# Patient Record
Sex: FEMALE | Race: WHITE | Employment: UNEMPLOYED | URBAN - METROPOLITAN AREA
[De-identification: names, ages, dates, MRNs, and addresses within clinical notes are randomized per-mention and may not be internally consistent; named-entity substitution may affect disease eponyms.]

---

## 2018-11-30 ENCOUNTER — HOSPITAL ENCOUNTER (INPATIENT)
Age: 33
LOS: 4 days | Discharge: HOME | DRG: 881 | End: 2018-12-04
Attending: EMERGENCY MEDICINE
Payer: SELF-PAY

## 2018-11-30 DIAGNOSIS — R45.89 SUICIDAL BEHAVIOR WITHOUT ATTEMPTED SELF-INJURY: Primary | ICD-10-CM

## 2018-11-30 PROBLEM — F32.A DEPRESSION: Status: ACTIVE | Noted: 2018-11-30

## 2018-11-30 LAB
ALBUMIN SERPL-MCNC: 4.1 G/DL (ref 3.5–5)
ALBUMIN/GLOB SERPL: 1 {RATIO} (ref 1.1–2.2)
ALP SERPL-CCNC: 60 U/L (ref 45–117)
ALT SERPL-CCNC: 19 U/L (ref 12–78)
AMPHET UR QL SCN: NEGATIVE
ANION GAP SERPL CALC-SCNC: 8 MMOL/L (ref 5–15)
APAP SERPL-MCNC: <2 UG/ML (ref 10–30)
APPEARANCE UR: CLEAR
AST SERPL-CCNC: 11 U/L (ref 15–37)
BACTERIA URNS QL MICRO: NEGATIVE /HPF
BARBITURATES UR QL SCN: NEGATIVE
BASOPHILS # BLD: 0.1 K/UL (ref 0–0.1)
BASOPHILS NFR BLD: 1 % (ref 0–1)
BENZODIAZ UR QL: POSITIVE
BILIRUB SERPL-MCNC: 0.3 MG/DL (ref 0.2–1)
BILIRUB UR QL: NEGATIVE
BUN SERPL-MCNC: 7 MG/DL (ref 6–20)
BUN/CREAT SERPL: 10 (ref 12–20)
CALCIUM SERPL-MCNC: 8.8 MG/DL (ref 8.5–10.1)
CANNABINOIDS UR QL SCN: NEGATIVE
CHLORIDE SERPL-SCNC: 108 MMOL/L (ref 97–108)
CO2 SERPL-SCNC: 25 MMOL/L (ref 21–32)
COCAINE UR QL SCN: NEGATIVE
COLOR UR: ABNORMAL
CREAT SERPL-MCNC: 0.71 MG/DL (ref 0.55–1.02)
DIFFERENTIAL METHOD BLD: NORMAL
DRUG SCRN COMMENT,DRGCM: ABNORMAL
EOSINOPHIL # BLD: 0.1 K/UL (ref 0–0.4)
EOSINOPHIL NFR BLD: 1 % (ref 0–7)
EPITH CASTS URNS QL MICRO: ABNORMAL /LPF
ERYTHROCYTE [DISTWIDTH] IN BLOOD BY AUTOMATED COUNT: 12.1 % (ref 11.5–14.5)
ETHANOL SERPL-MCNC: <10 MG/DL
GLOBULIN SER CALC-MCNC: 4 G/DL (ref 2–4)
GLUCOSE SERPL-MCNC: 109 MG/DL (ref 65–100)
GLUCOSE UR STRIP.AUTO-MCNC: NEGATIVE MG/DL
HCG UR QL: NEGATIVE
HCT VFR BLD AUTO: 41.2 % (ref 35–47)
HGB BLD-MCNC: 13.9 G/DL (ref 11.5–16)
HGB UR QL STRIP: ABNORMAL
IMM GRANULOCYTES # BLD: 0 K/UL (ref 0–0.04)
IMM GRANULOCYTES NFR BLD AUTO: 0 % (ref 0–0.5)
KETONES UR QL STRIP.AUTO: NEGATIVE MG/DL
LEUKOCYTE ESTERASE UR QL STRIP.AUTO: NEGATIVE
LYMPHOCYTES # BLD: 1.9 K/UL (ref 0.8–3.5)
LYMPHOCYTES NFR BLD: 26 % (ref 12–49)
MCH RBC QN AUTO: 32.8 PG (ref 26–34)
MCHC RBC AUTO-ENTMCNC: 33.7 G/DL (ref 30–36.5)
MCV RBC AUTO: 97.2 FL (ref 80–99)
METHADONE UR QL: NEGATIVE
MONOCYTES # BLD: 0.4 K/UL (ref 0–1)
MONOCYTES NFR BLD: 5 % (ref 5–13)
NEUTS SEG # BLD: 5 K/UL (ref 1.8–8)
NEUTS SEG NFR BLD: 67 % (ref 32–75)
NITRITE UR QL STRIP.AUTO: NEGATIVE
NRBC # BLD: 0 K/UL (ref 0–0.01)
NRBC BLD-RTO: 0 PER 100 WBC
OPIATES UR QL: POSITIVE
PCP UR QL: NEGATIVE
PH UR STRIP: 7.5 [PH] (ref 5–8)
PLATELET # BLD AUTO: 243 K/UL (ref 150–400)
PMV BLD AUTO: 9.9 FL (ref 8.9–12.9)
POTASSIUM SERPL-SCNC: 4 MMOL/L (ref 3.5–5.1)
PROT SERPL-MCNC: 8.1 G/DL (ref 6.4–8.2)
PROT UR STRIP-MCNC: NEGATIVE MG/DL
RBC # BLD AUTO: 4.24 M/UL (ref 3.8–5.2)
RBC #/AREA URNS HPF: ABNORMAL /HPF (ref 0–5)
SALICYLATES SERPL-MCNC: <1.7 MG/DL (ref 2.8–20)
SODIUM SERPL-SCNC: 141 MMOL/L (ref 136–145)
SP GR UR REFRACTOMETRY: 1.01 (ref 1–1.03)
UROBILINOGEN UR QL STRIP.AUTO: 0.2 EU/DL (ref 0.2–1)
WBC # BLD AUTO: 7.5 K/UL (ref 3.6–11)
WBC URNS QL MICRO: ABNORMAL /HPF (ref 0–4)

## 2018-11-30 PROCEDURE — 81025 URINE PREGNANCY TEST: CPT

## 2018-11-30 PROCEDURE — 80053 COMPREHEN METABOLIC PANEL: CPT

## 2018-11-30 PROCEDURE — 81001 URINALYSIS AUTO W/SCOPE: CPT

## 2018-11-30 PROCEDURE — 80307 DRUG TEST PRSMV CHEM ANLYZR: CPT

## 2018-11-30 PROCEDURE — 65270000029 HC RM PRIVATE

## 2018-11-30 PROCEDURE — 36415 COLL VENOUS BLD VENIPUNCTURE: CPT

## 2018-11-30 PROCEDURE — 74011250637 HC RX REV CODE- 250/637: Performed by: EMERGENCY MEDICINE

## 2018-11-30 PROCEDURE — 99284 EMERGENCY DEPT VISIT MOD MDM: CPT

## 2018-11-30 PROCEDURE — 85025 COMPLETE CBC W/AUTO DIFF WBC: CPT

## 2018-11-30 RX ORDER — BUSPIRONE HYDROCHLORIDE 7.5 MG/1
7.5 TABLET ORAL
Status: ON HOLD | COMMUNITY
End: 2018-12-04 | Stop reason: SDUPTHER

## 2018-11-30 RX ORDER — BACLOFEN 10 MG/1
10 TABLET ORAL
Status: COMPLETED | OUTPATIENT
Start: 2018-11-30 | End: 2018-11-30

## 2018-11-30 RX ORDER — CLONIDINE HYDROCHLORIDE 0.1 MG/1
0.1 TABLET ORAL
Status: COMPLETED | OUTPATIENT
Start: 2018-11-30 | End: 2018-11-30

## 2018-11-30 RX ORDER — BUPROPION HYDROCHLORIDE 300 MG/1
300 TABLET ORAL
Status: ON HOLD | COMMUNITY
End: 2018-12-04 | Stop reason: SDUPTHER

## 2018-11-30 RX ORDER — GABAPENTIN 300 MG/1
600 CAPSULE ORAL 3 TIMES DAILY
Status: ON HOLD | COMMUNITY
End: 2018-12-04 | Stop reason: SDUPTHER

## 2018-11-30 RX ADMIN — CLONIDINE HYDROCHLORIDE 0.1 MG: 0.1 TABLET ORAL at 20:44

## 2018-11-30 RX ADMIN — BACLOFEN 10 MG: 10 TABLET ORAL at 20:44

## 2018-11-30 NOTE — ED TRIAGE NOTES
Pt arrives via personal vehicle from home for medical clearance for rehab facility [facility]). Pt reports last using heroin and Valium yesterday. +depression, +SI. -no SI plan.

## 2018-11-30 NOTE — ED PROVIDER NOTES
33 y.o. female with no significant past medical history who presents from home with chief complaint of SI. Pt had a recent relapse after 2 years of being sober. She states that she has been grieving the death of multiple friends and started using numerous illicit drugs including heroin, benzodiazepines, cocaine, methamphetamine, and LSD. She states that she has been mostly snorting heroin and taking Valium with her last use last night. She had a recent DUI last week and has been experiencing SI since that time. She has a prior suicide attempt 2 years ago where she jumped off of the [facility]. Pt states that she has a good recovery group and sponsor here in [place] and just started taking classes at [address] [place] [address]. Pt denies fever, chills, CP, SOB, N/V/D. There are no other acute medical concerns at this time. Social hx: current everyday tobacco smoker (1 pack/day); (+) illicit drug use ( benzodiazepines, heroin, cocaine, and methamphetamines) Note written by Prakash Ordonez, as dictated by Galileo Saab MD 6:53 PM 
 
 
The history is provided by the patient. No  was used. Past Medical History:  
Diagnosis Date  Anxiety  Depression History reviewed. No pertinent surgical history. History reviewed. No pertinent family history. Social History Socioeconomic History  Marital status: SINGLE Spouse name: Not on file  Number of children: Not on file  Years of education: Not on file  Highest education level: Not on file Social Needs  Financial resource strain: Not on file  Food insecurity - worry: Not on file  Food insecurity - inability: Not on file  Transportation needs - medical: Not on file  Transportation needs - non-medical: Not on file Occupational History  Not on file Tobacco Use  Smoking status: Not on file Substance and Sexual Activity  Alcohol use: Not on file  Drug use: Not on file  Sexual activity: Not on file Other Topics Concern  Not on file Social History Narrative  Not on file ALLERGIES: Patient has no allergy information on record. Review of Systems Constitutional: Negative for chills, diaphoresis and fever. HENT: Negative for congestion, postnasal drip, rhinorrhea and sore throat. Eyes: Negative for photophobia, discharge, redness and visual disturbance. Respiratory: Negative for cough, chest tightness, shortness of breath and wheezing. Cardiovascular: Negative for chest pain, palpitations and leg swelling. Gastrointestinal: Negative for abdominal distention, abdominal pain, blood in stool, constipation, diarrhea, nausea and vomiting. Genitourinary: Negative for difficulty urinating, dysuria, frequency, hematuria and urgency. Musculoskeletal: Negative for arthralgias, back pain, joint swelling and myalgias. Skin: Negative for color change and rash. Neurological: Negative for dizziness, speech difficulty, weakness, light-headedness, numbness and headaches. Psychiatric/Behavioral: Positive for dysphoric mood and suicidal ideas. Negative for confusion. The patient is not nervous/anxious. All other systems reviewed and are negative. Vitals:  
 11/30/18 1609 Pulse: (!) 102 SpO2: 100% Physical Exam  
Constitutional: She is oriented to person, place, and time. She appears well-developed and well-nourished. No distress. HENT:  
Head: Normocephalic and atraumatic. Right Ear: External ear normal.  
Left Ear: External ear normal.  
Nose: Nose normal.  
Mouth/Throat: Oropharynx is clear and moist.  
Eyes: Conjunctivae and EOM are normal. Pupils are equal, round, and reactive to light. No scleral icterus. Neck: Normal range of motion. Neck supple. No JVD present. No tracheal deviation present. No thyromegaly present. Cardiovascular: Normal rate, regular rhythm and normal heart sounds.  Exam reveals no gallop and no friction rub. No murmur heard. Pulmonary/Chest: Effort normal and breath sounds normal. No respiratory distress. She has no wheezes. She has no rales. She exhibits no tenderness. Abdominal: Soft. Bowel sounds are normal. She exhibits no distension and no mass. There is no tenderness. There is no rebound and no guarding. Musculoskeletal: Normal range of motion. She exhibits no edema or tenderness. Lymphadenopathy:  
  She has no cervical adenopathy. Neurological: She is alert and oriented to person, place, and time. She has normal strength. She displays no atrophy and no tremor. No cranial nerve deficit. She exhibits normal muscle tone. Coordination and gait normal.  
Skin: Skin is warm and dry. No rash noted. She is not diaphoretic. No erythema. Areas with erythema and mild plaques consistent with her psoriasis. Psychiatric: Her behavior is normal. Judgment and thought content normal.  
Nursing note and vitals reviewed. Note written by Prakash Jaime, as dictated by Maciej Reeder MD 6:54 PM  
 
MDM Number of Diagnoses or Management Options Diagnosis management comments: RODRIGOS Impression: 67-year-old female with history of multi-substance abuse who had been clean for almost 2 years presents emergency department acute relapse. She is accompanied by her counselor and there're seeking to get her into a previous unit where she was stabilized. She is here for medical clearance. She denies any suicidal homicidal ideation and has been interviewed by behavioral health. Plan of care will be the site rule out medical clearing labs and will continue treating accordingly. Procedures CONSULT NOTE: 
6:55 PM Maciej Reeder MD spoke with Raymundo Rose for [employer]. Discussed available diagnostic tests and clinical findings. The pt's counselor has a bed arranged for her to be admitted at [facility].   
 
 
 8:48 PM 
 Based on evaluation of patient and review of all labs, patient is medically stable and cleared. Mental Health Personnel has been called to see the patient for psychiatric prescreening.

## 2018-12-01 PROCEDURE — 74011250637 HC RX REV CODE- 250/637

## 2018-12-01 PROCEDURE — 74011250637 HC RX REV CODE- 250/637: Performed by: PSYCHIATRY & NEUROLOGY

## 2018-12-01 PROCEDURE — 65220000003 HC RM SEMIPRIVATE PSYCH

## 2018-12-01 RX ORDER — BUPROPION HYDROCHLORIDE 150 MG/1
150 TABLET, EXTENDED RELEASE ORAL DAILY
Status: DISCONTINUED | OUTPATIENT
Start: 2018-12-01 | End: 2018-12-04 | Stop reason: HOSPADM

## 2018-12-01 RX ORDER — IBUPROFEN 200 MG
1 TABLET ORAL
Status: DISCONTINUED | OUTPATIENT
Start: 2018-12-01 | End: 2018-12-04 | Stop reason: HOSPADM

## 2018-12-01 RX ORDER — BENZTROPINE MESYLATE 1 MG/ML
2 INJECTION INTRAMUSCULAR; INTRAVENOUS
Status: DISCONTINUED | OUTPATIENT
Start: 2018-12-01 | End: 2018-12-04 | Stop reason: HOSPADM

## 2018-12-01 RX ORDER — LORAZEPAM 2 MG/1
4 TABLET ORAL
Status: DISCONTINUED | OUTPATIENT
Start: 2018-12-01 | End: 2018-12-03

## 2018-12-01 RX ORDER — ACETAMINOPHEN 325 MG/1
650 TABLET ORAL
Status: DISCONTINUED | OUTPATIENT
Start: 2018-12-01 | End: 2018-12-04 | Stop reason: HOSPADM

## 2018-12-01 RX ORDER — ZOLPIDEM TARTRATE 10 MG/1
10 TABLET ORAL
Status: DISCONTINUED | OUTPATIENT
Start: 2018-12-01 | End: 2018-12-01

## 2018-12-01 RX ORDER — LORAZEPAM 1 MG/1
1 TABLET ORAL
Status: DISCONTINUED | OUTPATIENT
Start: 2018-12-01 | End: 2018-12-02

## 2018-12-01 RX ORDER — IBUPROFEN 400 MG/1
400 TABLET ORAL
Status: DISCONTINUED | OUTPATIENT
Start: 2018-12-01 | End: 2018-12-04 | Stop reason: HOSPADM

## 2018-12-01 RX ORDER — OLANZAPINE 5 MG/1
5 TABLET ORAL
Status: DISCONTINUED | OUTPATIENT
Start: 2018-12-01 | End: 2018-12-04 | Stop reason: HOSPADM

## 2018-12-01 RX ORDER — LORAZEPAM 2 MG/ML
2 INJECTION INTRAMUSCULAR
Status: DISCONTINUED | OUTPATIENT
Start: 2018-12-01 | End: 2018-12-04 | Stop reason: HOSPADM

## 2018-12-01 RX ORDER — GABAPENTIN 300 MG/1
600 CAPSULE ORAL 3 TIMES DAILY
Status: DISCONTINUED | OUTPATIENT
Start: 2018-12-01 | End: 2018-12-04 | Stop reason: HOSPADM

## 2018-12-01 RX ORDER — METHADONE HYDROCHLORIDE 10 MG/1
15 TABLET ORAL DAILY
Status: COMPLETED | OUTPATIENT
Start: 2018-12-02 | End: 2018-12-03

## 2018-12-01 RX ORDER — ZOLPIDEM TARTRATE 5 MG/1
5 TABLET ORAL
Status: DISCONTINUED | OUTPATIENT
Start: 2018-12-01 | End: 2018-12-03

## 2018-12-01 RX ORDER — LORAZEPAM 2 MG/1
2 TABLET ORAL
Status: DISCONTINUED | OUTPATIENT
Start: 2018-12-01 | End: 2018-12-03

## 2018-12-01 RX ORDER — METHADONE HYDROCHLORIDE 10 MG/1
30 TABLET ORAL ONCE
Status: DISCONTINUED | OUTPATIENT
Start: 2018-12-01 | End: 2018-12-01

## 2018-12-01 RX ORDER — BENZTROPINE MESYLATE 2 MG/1
2 TABLET ORAL
Status: DISCONTINUED | OUTPATIENT
Start: 2018-12-01 | End: 2018-12-04 | Stop reason: HOSPADM

## 2018-12-01 RX ORDER — BUSPIRONE HYDROCHLORIDE 5 MG/1
7.5 TABLET ORAL 3 TIMES DAILY
Status: DISCONTINUED | OUTPATIENT
Start: 2018-12-01 | End: 2018-12-04 | Stop reason: HOSPADM

## 2018-12-01 RX ORDER — ADHESIVE BANDAGE
30 BANDAGE TOPICAL DAILY PRN
Status: DISCONTINUED | OUTPATIENT
Start: 2018-12-01 | End: 2018-12-04 | Stop reason: HOSPADM

## 2018-12-01 RX ORDER — METHADONE HYDROCHLORIDE 10 MG/1
30 TABLET ORAL ONCE
Status: COMPLETED | OUTPATIENT
Start: 2018-12-01 | End: 2018-12-01

## 2018-12-01 RX ADMIN — GABAPENTIN 600 MG: 300 CAPSULE ORAL at 21:17

## 2018-12-01 RX ADMIN — BUSPIRONE HYDROCHLORIDE 7.5 MG: 10 TABLET ORAL at 21:17

## 2018-12-01 RX ADMIN — BUPROPION HYDROCHLORIDE 150 MG: 150 TABLET, EXTENDED RELEASE ORAL at 12:38

## 2018-12-01 RX ADMIN — BUSPIRONE HYDROCHLORIDE 7.5 MG: 10 TABLET ORAL at 15:40

## 2018-12-01 RX ADMIN — GABAPENTIN 600 MG: 300 CAPSULE ORAL at 12:38

## 2018-12-01 RX ADMIN — ZOLPIDEM TARTRATE 5 MG: 5 TABLET ORAL at 21:17

## 2018-12-01 RX ADMIN — GABAPENTIN 600 MG: 300 CAPSULE ORAL at 16:30

## 2018-12-01 RX ADMIN — LORAZEPAM 2 MG: 2 TABLET ORAL at 15:39

## 2018-12-01 RX ADMIN — METHADONE HYDROCHLORIDE 30 MG: 10 TABLET ORAL at 09:09

## 2018-12-01 NOTE — BH NOTES
[address] [facility] 60 Master Treatment Plan for [employer] Date Treatment Plan Initiated: 12/1/2018 Treatment Plan Modalities: 
Type of Modality Amount (x minutes) Frequency (x/week) Duration (x days) Name of Responsible Staff Community & wrap-up meetings to encourage peer interactions 15 7 1 GRIS Salinas Group psychotherapy to assist in building coping skills and internal controls 60 7 [address] Therapeutic activity groups to build coping skills 60 7 1 Tucker Hernandez Psychoeducation in group setting to address:  
Medication education 15 7 1 JUAN CARLOS Pereira Coping skills Relaxation techniques Symptom management Discharge planning [address] [place] [address] 71 Spirituality [address] [facility] 60 1 1 volunteer Recovery/AA/NA 
    volunteer Physician medication management 15 7 1 Dr. Ryann Cortes Family meeting/discharge planning Mario Fitzgerald

## 2018-12-01 NOTE — INTERDISCIPLINARY ROUNDS
Behavioral Health Interdisciplinary Rounds Patient Name: Zita Meza  Age: 33 y.o. Room/Bed:  730/02 Primary Diagnosis: <principal problem not specified> Admission Status: Voluntary Readmission within 30 days: no 
Power of  in place: no 
Patient requires a blocked bed: no          Reason for blocked bed: VTE Prophylaxis: No 
 
Mobility needs/Fall risk: no 
Flu Vaccine : no, declined Nutritional Plan: no 
Consults:      
Labs/Testing due today?: no 
 
Sleep hours:  4+ Participation in Care/Groups: new pt. Medication Compliant?: new pt PRNS (last 24 hours): Antianxiety Restraints (last 24 hours):  no 
  
CIWA (range last 24 hours): CIWA-Ar Total: 4  
COWS (range last 24 hours): COWS Total: 5 Alcohol screening (AUDIT) completed - If applicable, date SBIRT discussed in treatment team AND documented:  
 
 
AUDIT Screen Score: Document Brief Intervention (corresponds directly with the 5 A's, Ask, Advise, Assess, Assist, and Arrange):  AA - Unit At- Risk Patients (Score 7-15 for women; 8-15 for men) Discuss concern patient is drinking at unhealthy levels known to increase risk of alcohol-related health problems. Is Patient ready to commit to change? Yes Seeking Detox - In Pt [place] @ [place] If Yes: 
? Set goal: Complete safe detox ? Plan: Refer to In Pt [address] [place] or [facility] for additional days for recovery ? Educational materials provided Harmful use or Dependence (Score 16 or greater) ? Discuss short term and long term health risks of consuming alcohol ? Recommendations ? Negotiate drinking goal 
? Recommend addiction specialist/center ? Arrange follow-up appointments. Tobacco - patient is a smoker:  Yes Illegal Drugs use:  Poly-Substance : heroin, alcohol, benzo's and  
 
24 hour chart check complete: yes Patient goal(s) for today: Safe detox Treatment team focus/goals: Complete Psych Social Assessment Progress note : Alert, fair historian and acknowledged  using lots of drugs - seeking detox so that she can enter in pt [place] 
 
LOS:  1  Expected LOS:  
 
Financial concerns/prescription coverage: [employer] Date of last family contact Family requesting physician contact today:  
Discharge plan: Pt hopes to be admitted to either [facility] or [facility] [address] [facility] Guns in the home: N/A Outpatient provider(s): Maggie Tran - Counselor randhawa [facility] (?) and [facility] Dr Otila Apley ( case maybe closed) Participating treatment team members: Gisela Salomon, Dr Lisa Alberto, Vanessa Thakkar 27 and Hannah Cleveland RN

## 2018-12-01 NOTE — PROGRESS NOTES
Problem: Depressed Mood (Adult/Pediatric) Goal: *STG: Verbalizes anger, guilt, and other feelings in a constructive manor Outcome: Progressing Towards Goal 
Patient has been anxious and angry. She was received agitated and sobbing in her room. Patient splits staff by telling evening shift some complaints about day shift staff today. Staff continue q15 checks and encourage pt to share feelings.

## 2018-12-01 NOTE — BH NOTES
PRN Medication Documentation 1544Specific patient behavior that led to need for PRN medication: received pt agitated. Sobbing in her room, tremulous and complaining of severe anxiety and withdrawal symptoms. CIWA= 10 Staff interventions attempted prior to PRN being given: encourage fluids, give emotional support, encourage positive activities, monitor VS and withdrawal symptoms PRN medication given: ativan 2mg po 
1640 Patient response/effectiveness of PRN medication: CIWA=4.

## 2018-12-01 NOTE — BH NOTES
GROUP THERAPY PROGRESS NOTE Nikos Maciel is participating in [place]. Group time: 15 minutes Personal goal for participation:  
 
Goal orientation: community Group therapy participation: active Therapeutic interventions reviewed and discussed: Unit rules & schedule, importance of setting goals and participating in treatment team meeting. Impression of participation: Pt was participating in community meeting, occasionally closing eyes, but still engaging in conversation. Seemed irritable towards the end of group and said something like \"they keep changing all of my stuff\" and \"It's so hard being here voluntarily. Get me out of here\". But got back on track after being redirected.

## 2018-12-01 NOTE — ROUTINE PROCESS
TRANSFER - OUT REPORT: 
 
Verbal report given to Sarahi Beck RN(name) on Rosette Suh  being transferred to (unit) for routine progression of care Report consisted of patients Situation, Background, Assessment and  
Recommendations(SBAR). Information from the following report(s) SBAR, ED Summary, Procedure Summary, MAR and Recent Results was reviewed with the receiving nurse. Lines:    
 
Opportunity for questions and clarification was provided. Patient transported with: 
 Registered Nurse HPD Officer

## 2018-12-01 NOTE — PROGRESS NOTES
TRANSFER - IN REPORT: 
 
Verbal report received from Estrada Wu [facility]  on [employer]  being received from [facility]/ED  for routine progression of care Report consisted of patients Situation, Background, Assessment and  
Recommendations(SBAR). Information from the following report(s) SBAR, Kardex, ED Summary and Recent Results was reviewed with the receiving nurse. Opportunity for questions and clarification was provided. Assessment completed upon patients arrival to unit and care assumed.

## 2018-12-01 NOTE — BSMART NOTE
PSYCHOSOCIAL ASSESSMENT Patient identifying info: 
Arlen Camp is a 33 y.o., female admitted 11/30/2018  5:41 PM  
 
Presenting problem and precipitating factors: Pt came to the ER seeking tx for depression, anxiety and detox - poly-substances of heroin, valium, methamphetamines, LDS Alicia and etc 
 
Mental status assessment: Alert, tearful,. Seeking help for depression and recent relapse on drugs Current psychiatric/substance abuse providers and contact info: Dr Roverto Beard ( Case maybe closed) and Mynor BERUMEN [facility] weekly therapy Previous psychiatric or substance abuse services/providers and response to treatment: Yes Family history of substance abuse or mental illness: Yes  [facility] Tx 2016 Substance abuse history: [place] use detox heroin, etoh  and benzo's . Pt is also a heavy daily smoker Pt was placed on detox protocol with Methadone for three days Social History Tobacco Use  Smoking status: Current Every Day Smoker Packs/day: 1.00  Smokeless tobacco: Never Used Substance Use Topics  Alcohol use: Yes History of biomedical complications associated with substance abuse:  Unk 
 
Patient's current acceptance of treatment or motivation for change: Yes - detox and enter  In pt sa tx @ [employer] [place] Tx Family constellation: 
 
Is significant other involved? N/A Describe support system: Pt's counselor Clyde Hair) and hs is involved in her tx. Pt sighed CRISTIANA so that he has access to her HPI Describe living arrangements and home environment:  Pt is single, lives with roommates and hopes to enter SA Tx or may have to return home Health issues: Review H&P Hospital Problems  Never Reviewed Codes Class Noted POA Depression ICD-10-CM: F32.9 ICD-9-CM: 942  11/30/2018 Unknown Trauma history: Grief - death of friend- triggered relapse on drugs Legal issues: Yes- 3 DUI 's  
 
History of  service: N/A 
 
 Financial status: Unk 
 
Restoration/cultural factors: Not voiced Education/work history:  HS grad , currently in college but unemployed Have you been licensed as a health care professional ( current or  ) : No 
 
Leisure and recreation preferences: 
 
Describe coping skills: Ineffective and poor judgement Felicia Saavedra 2018
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
## 2018-12-01 NOTE — PROGRESS NOTES
Problem: Opiate Withdrawal 
Goal: *STG: Remains safe in hospital 
Outcome: Progressing Towards Goal 
Visible on the unit. Mood is anxious. Pt denies SI. Compliant with meals and medications. Goal: *STG: Attends activities and groups Outcome: Progressing Towards Goal 
Pt attended treatment team.

## 2018-12-01 NOTE — BH NOTES
1240- pt calm cooperative. Pt states she wants medication to sleep. VS stable. Scheduled medication given. Education provided. Coping skills encouraged. Re evaluate in an hour. CIWA 2, COW 1. Pt using coping skills. 1430- pt tearful, focused wanting more medications. Poor insight. Tolerating education poorly. CIWA 6. Education provided related to detox monitoring and treatment. Pt irritable. Not appropriate to transfer to  general unit at this time.

## 2018-12-02 PROBLEM — F19.20: Status: ACTIVE | Noted: 2018-12-02

## 2018-12-02 LAB
CHOLEST SERPL-MCNC: 219 MG/DL
GLUCOSE P FAST SERPL-MCNC: 79 MG/DL (ref 65–100)
HDLC SERPL-MCNC: 59 MG/DL
HDLC SERPL: 3.7 {RATIO} (ref 0–5)
LDLC SERPL CALC-MCNC: 139.2 MG/DL (ref 0–100)
LIPID PROFILE,FLP: ABNORMAL
TRIGL SERPL-MCNC: 104 MG/DL (ref ?–150)
VLDLC SERPL CALC-MCNC: 20.8 MG/DL

## 2018-12-02 PROCEDURE — 80061 LIPID PANEL: CPT

## 2018-12-02 PROCEDURE — 36415 COLL VENOUS BLD VENIPUNCTURE: CPT

## 2018-12-02 PROCEDURE — 74011250637 HC RX REV CODE- 250/637: Performed by: PSYCHIATRY & NEUROLOGY

## 2018-12-02 PROCEDURE — 74011250637 HC RX REV CODE- 250/637

## 2018-12-02 PROCEDURE — 82947 ASSAY GLUCOSE BLOOD QUANT: CPT

## 2018-12-02 PROCEDURE — 84443 ASSAY THYROID STIM HORMONE: CPT

## 2018-12-02 PROCEDURE — 74011250636 HC RX REV CODE- 250/636

## 2018-12-02 PROCEDURE — 65220000003 HC RM SEMIPRIVATE PSYCH

## 2018-12-02 RX ADMIN — BUSPIRONE HYDROCHLORIDE 7.5 MG: 10 TABLET ORAL at 08:47

## 2018-12-02 RX ADMIN — LORAZEPAM 2 MG: 2 TABLET ORAL at 15:56

## 2018-12-02 RX ADMIN — LORAZEPAM 2 MG: 2 TABLET ORAL at 21:27

## 2018-12-02 RX ADMIN — METHADONE HYDROCHLORIDE 15 MG: 10 TABLET ORAL at 08:47

## 2018-12-02 RX ADMIN — ACETAMINOPHEN 650 MG: 325 TABLET ORAL at 09:32

## 2018-12-02 RX ADMIN — LORAZEPAM 2 MG: 2 TABLET ORAL at 12:37

## 2018-12-02 RX ADMIN — GABAPENTIN 600 MG: 300 CAPSULE ORAL at 21:14

## 2018-12-02 RX ADMIN — GABAPENTIN 600 MG: 300 CAPSULE ORAL at 15:56

## 2018-12-02 RX ADMIN — IBUPROFEN 400 MG: 400 TABLET, FILM COATED ORAL at 12:38

## 2018-12-02 RX ADMIN — BUSPIRONE HYDROCHLORIDE 7.5 MG: 10 TABLET ORAL at 15:56

## 2018-12-02 RX ADMIN — GABAPENTIN 600 MG: 300 CAPSULE ORAL at 08:47

## 2018-12-02 RX ADMIN — ZOLPIDEM TARTRATE 5 MG: 5 TABLET ORAL at 22:11

## 2018-12-02 RX ADMIN — BUSPIRONE HYDROCHLORIDE 7.5 MG: 10 TABLET ORAL at 21:14

## 2018-12-02 RX ADMIN — BUPROPION HYDROCHLORIDE 150 MG: 150 TABLET, EXTENDED RELEASE ORAL at 08:47

## 2018-12-02 NOTE — BH NOTES
GROUP THERAPY PROGRESS NOTE Paris Tariq is participating in [place] [facility]. Group time: 15 minutes Personal goal for participation: Talk with treatment team 
 
Goal orientation: community Group therapy participation: active Therapeutic interventions reviewed and discussed: Unit rules, goals Impression of participation: Active

## 2018-12-02 NOTE — BH NOTES
GROUP THERAPY PROGRESS NOTE Manan Clark is participating in [facility]. Group time: 25 minutes Personal goal for participation: unit orientation and daily progress Goal orientation: personal 
 
Group therapy participation: active Therapeutic interventions reviewed and discussed: yes Impression of participation: Seems in fair spirits. Has been out on unit interacting appropriately with peers and staff though had been tearful early part of shift.  In group, expressed some frustration at not having been fully heard during treatment team.

## 2018-12-02 NOTE — PROGRESS NOTES
Problem: Depressed Mood (Adult/Pediatric) Goal: *STG: Verbalizes anger, guilt, and other feelings in a constructive manor Outcome: Progressing Towards Goal 
Pt has been calm/cooperative and talkative with peers. She denies SI. Staff will continue q15 checks and encourage pt to share feelings.

## 2018-12-02 NOTE — PROGRESS NOTES
Problem: Opiate Withdrawal 
Goal: *STG: Remains safe in hospital 
Outcome: Progressing Towards Goal 
Pt remains safe in the hospital on q 15 safety checks. Denies SI. Detox COW and CIWA monitoring ongoing. Education provided. Pt requires continuous education. Pt irritable, manipulative. Poor coping skills. Poor insight. Staff splitting attempts continuously. Goal: *STG: Attends activities and groups Outcome: Progressing Towards Goal 
Pt visible on the unit. Labile. Communication feelings, needs, concerns with staff. Problem: Depressed Mood (Adult/Pediatric) Goal: *STG: Remains safe in hospital 
Outcome: Progressing Towards Goal 
Pt remains safe in the hospital on q 15 min safety checks. Pt labile. Blaming others. [phone]  gives staff verbal permission to call Shilpi Tarango with [facility] [address] [phone]. Voice mail left for call back. Reason for call: inform Mr. Christina Kramer of scheduled visitation times and ask if Mr Christina Kramer will be able to see pt during scheduled times. [facility] calls unit; states he will see pt 2-3 pm and 7-8 pm. Pt aware. Pt provides updated home address for social work: \"[address] [phone]\"

## 2018-12-02 NOTE — PROGRESS NOTES
12/02/18 1237 CIWA/ETOH Withdrawal Scale Nausea and Vomiting 1 Tactile Disturbances 1 Tremor 0 Auditory Disturbances 0 Paroxysmal Sweats 0 Visual Disturbances 0 Anxiety 2 Headache, Fullness in Head 4 Agitation 0 Orientation and Clouding of Sensorium 0  
CIWA-Ar Total 8 Ativan given per protocol. Continue to monitor. 1337: CIWA reassessed it is a 0.

## 2018-12-02 NOTE — BH NOTES
PRN Medication Documentation 1620 Specific patient behavior that led to need for PRN medication: CIWA=9. Patient complains of withdrawal symptoms Staff interventions attempted prior to PRN being given: monitor vital signs, encourage fluids, give emotional support and encouragement PRN medication given: ativan 2mg po 
1720 Patient response/effectiveness of PRN medication: CIWA=0

## 2018-12-02 NOTE — PROGRESS NOTES
Problem: Falls - Risk of 
Goal: *Absence of Falls Document [place] Fall Risk and appropriate interventions in the flowsheet. Outcome: Progressing Towards Goal 
Fall Risk Interventions: 
 Pt is fall free

## 2018-12-02 NOTE — BH NOTES
PRN Medication Documentation Specific patient behavior that led to need for PRN medication: Pt requested medication PRN medication given: Tylenol for c/o \"HA. \" 
Patient response/effectiveness of PRN medication: pending. 10:29: Medication has been effective. 12:38: PRN Medication Documentation Specific patient behavior that led to need for PRN medication: Pt requested medication PRN medication given: Motrin Patient response/effectiveness of PRN medication: pending

## 2018-12-02 NOTE — BH NOTES
Behavioral Health Interdisciplinary Rounds Patient Name: Diane Medina  Age: 33 y.o. Room/Bed:  730/02 Primary Diagnosis: <principal problem not specified> Admission Status: Voluntary Readmission within 30 days: no 
Power of  in place: no 
Patient requires a blocked bed: no          Reason for blocked bed: n/a 
 
VTE Prophylaxis: Not indicated Mobility needs/Fall risk: no 
Flu Vaccine : no  
Nutritional Plan: no 
Consults: n/a Labs/Testing due today?: yes Sleep hours:  6.25 Participation in Care/Groups:  yes Medication Compliant?: Yes PRNS (last 24 hours): Antianxiety, Sleep Aid and nicoderm Restraints (last 24 hours):  no 
  
CIWA (range last 24 hours): CIWA-Ar Total: 0  
COWS (range last 24 hours): COWS Total: 0 Alcohol screening (AUDIT) completed - If applicable, date SBIRT discussed in treatment team AND documented:  
 
Tobacco - patient is a smoker: Have You Used Tobacco in the Past 30 Days: Yes Illegal Drugs use: Have You Used Any Illegal Substances Over the Past 12 Months: Yes 
 
24 hour chart check complete: yes Patient goal(s) for today:  
Treatment team focus/goals:  
Progress note LOS:  2  Expected LOS:  
 
Financial concerns/prescription coverage:   
Date of last family contact:      
Family requesting physician contact today:   
Discharge plan:  
Guns in the home: Outpatient provider(s):  
 
Participating treatment team members: Diane Medina, * (assigned SW),

## 2018-12-02 NOTE — H&P
INITIAL PSYCHIATRIC EVALUATION   
   
 
IDENTIFICATION:   
Patient Name  Sanchez Mott Date of Birth 1985 [facility] [id number] Medical Record Number  [id number] Age  33 y.o. PCP None Admit date:  2018 Room Number  732/74  @ [facility]  
Date of Service  2018 HISTORY  
 
   
REASON FOR HOSPITALIZATION: 
CC: \"Wanted detox/ rehab\". Pt admitted on a voluntary basis for severe depression, anxiety, substance abuse and SI   
HISTORY OF PRESENT ILLNESS:   
The patient, Sanchez Mott, is a 33 y.o. WHITE [place]  female with a past psychiatric history significant for polysubstance Dependence, MDD and CICI, who presents at this time with complaints of (and/or evidence of) the following emotional symptoms: depression, anxiety, withdrawal sx. The patient reports that she was sober for almost two years until her best friend  two weeks ago and she relapsed. He has been abusing valium, opiates, alcohol, cocaine, mdma. She reports being noncompliant with her medications including wellbutrin, buspar and gabapentin prescribed by Dr. Vahid Burgess. She was seen by her substance abuse counselor who brought her to the ED, with intentions of having her admitted to the . [place] [id number] and then [facility]. Patient motivated and interested in residential rehab program. ALLERGIES: No Known Allergies MEDICATIONS PRIOR TO ADMISSION:  
Medications Prior to Admission Medication Sig  
 gabapentin (NEURONTIN) 300 mg capsule Take 600 mg by mouth three (3) times daily.  busPIRone (BUSPAR) 7.5 mg tablet Take 7.5 mg by mouth three (3) times daily as needed.  buPROPion XL (WELLBUTRIN XL) 300 mg XL tablet Take 300 mg by mouth every morning. PAST MEDICAL HISTORY:  
Past Medical History:  
Diagnosis Date  Anxiety  Depression History reviewed. No pertinent surgical history. SOCIAL HISTORY: Lives alone in [facility]. Attending [employer] for social work. Social History Socioeconomic History  Marital status: SINGLE Spouse name: Not on file  Number of children: Not on file  Years of education: Not on file  Highest education level: Not on file Social Needs  Financial resource strain: Not on file  Food insecurity - worry: Not on file  Food insecurity - inability: Not on file  Transportation needs - medical: Not on file  Transportation needs - non-medical: Not on file Occupational History  Not on file Tobacco Use  Smoking status: Current Every Day Smoker Packs/day: 1.00  Smokeless tobacco: Never Used Substance and Sexual Activity  Alcohol use: Yes  Drug use: Yes Types: Heroin, Cocaine, Methamphetamines, Hallucinogenics, Benzodiazepines  Sexual activity: Not on file Other Topics Concern [address] Service Not Asked  Blood Transfusions Not Asked  Caffeine Concern Not Asked  Occupational Exposure Not Asked Allegra Beggs Hazards Not Asked  Sleep Concern Not Asked  Stress Concern Not Asked  Weight Concern Not Asked  Special Diet Not Asked  Back Care Not Asked  Exercise Not Asked  Bike Helmet Not Asked  Seat Belt Not Asked  Self-Exams Not Asked Social History Narrative  Not on file FAMILY HISTORY: History reviewed. No pertinent family history. History reviewed. No pertinent family history. REVIEW OF SYSTEMS:  
Negative except depression, anxiety Pertinent items are noted in the History of Present Illness. All other Systems reviewed and are considered negative. [facility] MENTAL STATUS EXAM (MSE): MSE FINDINGS ARE WITHIN NORMAL LIMITS (WNL) UNLESS OTHERWISE STATED BELOW. ( ALL OF THE BELOW CATEGORIES OF THE MSE HAVE BEEN REVIEWED (reviewed 12/1/2018) AND UPDATED AS DEEMED APPROPRIATE ) General Presentation age appropriate and casually dressed, cooperative Orientation oriented to time, place and person Vital Signs  See below (reviewed 12/1/2018); Vital Signs (BP, Pulse, & Temp) are within normal limits if not listed below. Gait and Station Stable/steady, no ataxia Musculoskeletal System No extrapyramidal symptoms (EPS); no abnormal muscular movements or Tardive Dyskinesia (TD); muscle strength and tone are within normal limits Language No aphasia or dysarthria Speech:  normal pitch and normal volume Thought Processes logical; normal rate of thoughts; good abstract reasoning/computation Thought Associations goal directed Thought Content free of delusions and free of hallucinations Suicidal Ideations no intention Homicidal Ideations no intention Mood:  anxious  and depressed Affect:  mood-congruent Memory recent  good Memory remote:  good Concentration/Attention:  distractable Fund of Knowledge wnl Insight:  fair Reliability fair Judgment:  fair VITALS:    
Patient Vitals for the past 24 hrs: 
 Temp Pulse Resp BP SpO2  
12/01/18 1946 98.2 °F (36.8 °C) 77 16 111/74   
12/01/18 1614 98.1 °F (36.7 °C) 88 16 153/73 100 % 12/01/18 1158 97.6 °F (36.4 °C) 70 16 127/79 98 % 12/01/18 0828 97.6 °F (36.4 °C) 76 16 107/72 98 % 12/01/18 0720   12    
12/01/18 0005 97.8 °F (36.6 °C) 73 16 128/84 98 % 11/30/18 2337 98.4 °F (36.9 °C) 72 16 128/83 99 % Wt Readings from Last 3 Encounters:  
11/30/18 70.3 kg (155 lb) Temp Readings from Last 3 Encounters:  
12/01/18 98.2 °F (36.8 °C) BP Readings from Last 3 Encounters:  
12/01/18 111/74 Pulse Readings from Last 3 Encounters:  
12/01/18 77 DATA LABORATORY DATA: 
Labs Reviewed METABOLIC PANEL, COMPREHENSIVE - Abnormal; Notable for the following components:  
    Result Value Glucose 109 (*)   
 BUN/Creatinine ratio 10 (*) AST (SGOT) 11 (*) A-G Ratio 1.0 (*) All other components within normal limits DRUG SCREEN, URINE - Abnormal; Notable for the following components: BENZODIAZEPINES POSITIVE (*) OPIATES POSITIVE (*) All other components within normal limits SALICYLATE - Abnormal; Notable for the following components:  
 Salicylate level <2.4 (*) All other components within normal limits ACETAMINOPHEN - Abnormal; Notable for the following components:  
 Acetaminophen level <2 (*) All other components within normal limits URINALYSIS W/ RFLX MICROSCOPIC - Abnormal; Notable for the following components:  
 Blood LARGE (*) All other components within normal limits CBC WITH AUTOMATED DIFF  
ETHYL ALCOHOL  
SAMPLES BEING HELD  
HCG URINE, QL. - POC Admission on 11/30/2018 Component Date Value Ref Range Status  WBC 11/30/2018 7.5  3.6 - 11.0 K/uL Final  
 RBC 11/30/2018 4.24  3.80 - 5.20 M/uL Final  
 HGB 11/30/2018 13.9  11.5 - 16.0 g/dL Final  
 HCT 11/30/2018 41.2  35.0 - 47.0 % Final  
 MCV 11/30/2018 97.2  80.0 - 99.0 FL Final  
 MCH 11/30/2018 32.8  26.0 - 34.0 PG Final  
 MCHC 11/30/2018 33.7  30.0 - 36.5 g/dL Final  
 RDW 11/30/2018 12.1  11.5 - 14.5 % Final  
 PLATELET 18/04/0775 972  150 - 400 K/uL Final  
 MPV 11/30/2018 9.9  8.9 - 12.9 FL Final  
 NRBC 11/30/2018 0.0  0  WBC Final  
 ABSOLUTE NRBC 11/30/2018 0.00  0.00 - 0.01 K/uL Final  
 NEUTROPHILS 11/30/2018 67  32 - 75 % Final  
 LYMPHOCYTES 11/30/2018 26  12 - 49 % Final  
 MONOCYTES 11/30/2018 5  5 - 13 % Final  
 EOSINOPHILS 11/30/2018 1  0 - 7 % Final  
 BASOPHILS 11/30/2018 1  0 - 1 % Final  
 IMMATURE GRANULOCYTES 11/30/2018 0  0.0 - 0.5 % Final  
 ABS. NEUTROPHILS 11/30/2018 5.0  1.8 - 8.0 K/UL Final  
 ABS. LYMPHOCYTES 11/30/2018 1.9  0.8 - 3.5 K/UL Final  
 ABS. MONOCYTES 11/30/2018 0.4  0.0 - 1.0 K/UL Final  
 ABS. EOSINOPHILS 11/30/2018 0.1  0.0 - 0.4 K/UL Final  
 ABS. BASOPHILS 11/30/2018 0.1  0.0 - 0.1 K/UL Final  
 ABS. IMM. GRANS.  11/30/2018 0.0  0.00 - 0.04 K/UL Final  
 DF 11/30/2018 AUTOMATED    Final  
  Sodium 11/30/2018 141  136 - 145 mmol/L Final  
 Potassium 11/30/2018 4.0  3.5 - 5.1 mmol/L Final  
 Chloride 11/30/2018 108  97 - 108 mmol/L Final  
 CO2 11/30/2018 25  21 - 32 mmol/L Final  
 Anion gap 11/30/2018 8  5 - 15 mmol/L Final  
 Glucose 11/30/2018 109* 65 - 100 mg/dL Final  
 BUN 11/30/2018 7  6 - 20 MG/DL Final  
 Creatinine 11/30/2018 0.71  0.55 - 1.02 MG/DL Final  
 BUN/Creatinine ratio 11/30/2018 10* 12 - 20   Final  
 GFR est AA 11/30/2018 >60  >60 ml/min/1.73m2 Final  
 GFR est non-AA 11/30/2018 >60  >60 ml/min/1.73m2 Final  
 Calcium 11/30/2018 8.8  8.5 - 10.1 MG/DL Final  
 Bilirubin, total 11/30/2018 0.3  0.2 - 1.0 MG/DL Final  
 ALT (SGPT) 11/30/2018 19  12 - 78 U/L Final  
 AST (SGOT) 11/30/2018 11* 15 - 37 U/L Final  
 Alk.  phosphatase 11/30/2018 60  45 - 117 U/L Final  
 Protein, total 11/30/2018 8.1  6.4 - 8.2 g/dL Final  
 Albumin 11/30/2018 4.1  3.5 - 5.0 g/dL Final  
 Globulin 11/30/2018 4.0  2.0 - 4.0 g/dL Final  
 A-G Ratio 11/30/2018 1.0* 1.1 - 2.2   Final  
 ALCOHOL(ETHYL),SERUM 11/30/2018 <10  <10 MG/DL Final  
 AMPHETAMINES 11/30/2018 NEGATIVE   NEG   Final  
 BARBITURATES 11/30/2018 NEGATIVE   NEG   Final  
 BENZODIAZEPINES 11/30/2018 POSITIVE* NEG   Final  
 COCAINE 11/30/2018 NEGATIVE   NEG   Final  
 METHADONE 11/30/2018 NEGATIVE   NEG   Final  
 OPIATES 11/30/2018 POSITIVE* NEG   Final  
 PCP(PHENCYCLIDINE) 11/30/2018 NEGATIVE   NEG   Final  
 THC (TH-CANNABINOL) 11/30/2018 NEGATIVE   NEG   Final  
 Drug screen comment 11/30/2018 (NOTE)   Final  
 Salicylate level 53/93/8689 <1.7* 2.8 - 20.0 MG/DL Final  
 Acetaminophen level 11/30/2018 <2* 10 - 30 ug/mL Final  
 Color 11/30/2018 YELLOW/STRAW    Final  
 Appearance 11/30/2018 CLEAR  CLEAR   Final  
 Specific gravity 11/30/2018 1.007  1.003 - 1.030   Final  
 pH (UA) 11/30/2018 7.5  5.0 - 8.0   Final  
 Protein 11/30/2018 NEGATIVE   NEG mg/dL Final  
  Glucose 11/30/2018 NEGATIVE   NEG mg/dL Final  
 Ketone 11/30/2018 NEGATIVE   NEG mg/dL Final  
 Bilirubin 11/30/2018 NEGATIVE   NEG   Final  
 Blood 11/30/2018 LARGE* NEG   Final  
 Urobilinogen 11/30/2018 0.2  0.2 - 1.0 EU/dL Final  
 Nitrites 11/30/2018 NEGATIVE   NEG   Final  
 Leukocyte Esterase 11/30/2018 NEGATIVE   NEG   Final  
 WBC 11/30/2018 0-4  0 - 4 /hpf Final  
 RBC 11/30/2018 0-5  0 - 5 /hpf Final  
 Epithelial cells 11/30/2018 FEW  FEW /lpf Final  
 Bacteria 11/30/2018 NEGATIVE   NEG /hpf Final  
 Pregnancy test,urine (POC) 11/30/2018 NEGATIVE   NEG   Final  
 
  
RADIOLOGY REPORTS: 
No results found for this or any previous visit. No results found. MEDICATIONS ALL MEDICATIONS Current Facility-Administered Medications Medication Dose Route Frequency  ziprasidone (GEODON) 20 mg in sterile water (preservative free) 1 mL injection  20 mg IntraMUSCular BID PRN  
 OLANZapine (ZyPREXA) tablet 5 mg  5 mg Oral Q6H PRN  
 benztropine (COGENTIN) tablet 2 mg  2 mg Oral BID PRN  
 benztropine (COGENTIN) injection 2 mg  2 mg IntraMUSCular BID PRN  
 LORazepam (ATIVAN) injection 2 mg  2 mg IntraMUSCular Q4H PRN  
 LORazepam (ATIVAN) tablet 1 mg  1 mg Oral Q4H PRN  
 acetaminophen (TYLENOL) tablet 650 mg  650 mg Oral Q4H PRN  
 ibuprofen (MOTRIN) tablet 400 mg  400 mg Oral Q8H PRN  
 magnesium hydroxide (MILK OF MAGNESIA) 400 mg/5 mL oral suspension 30 mL  30 mL Oral DAILY PRN  
 nicotine (NICODERM CQ) 21 mg/24 hr patch 1 Patch  1 Patch TransDERmal DAILY PRN  
 [START ON 12/2/2018] methadone (DOLOPHINE) tablet 15 mg  15 mg Oral DAILY  zolpidem (AMBIEN) tablet 5 mg  5 mg Oral QHS PRN  
 gabapentin (NEURONTIN) capsule 600 mg  600 mg Oral TID  busPIRone (BUSPAR) tablet 7.5 mg  7.5 mg Oral TID  buPROPion SR (WELLBUTRIN SR) tablet 150 mg  150 mg Oral DAILY  LORazepam (ATIVAN) tablet 2 mg  2 mg Oral Q1H PRN  
 LORazepam (ATIVAN) tablet 4 mg  4 mg Oral Q1H PRN  
  
 SCHEDULED MEDICATIONS Current Facility-Administered Medications Medication Dose Route Frequency  [START ON 12/2/2018] methadone (DOLOPHINE) tablet 15 mg  15 mg Oral DAILY  gabapentin (NEURONTIN) capsule 600 mg  600 mg Oral TID  busPIRone (BUSPAR) tablet 7.5 mg  7.5 mg Oral TID  buPROPion SR (WELLBUTRIN SR) tablet 150 mg  150 mg Oral DAILY ASSESSMENT & PLAN The patient, Emani Cooley, is a 33 y.o.  female who presents at this time for treatment of the following diagnoses: 
Patient Active Hospital Problem List: 
 
Major Depression (11/30/2018) Assessment: moderate to severe Plan: Agree with inpatient hospitalization for safety monitoring and medication management Medications- gabapentin, buspar, wellbutrin CICI Assessment: moderate, exacerbated by withdrawal 
Plan: Buspar 7.5mg daily Polysubstance Dependence Assessment: chronic, recent relapse Plan:  
Opiate Detox- methadone three day detox Valium/ Etoh detox- ciwa scoring with ativan 
 cocaine dependenc A coordinated, multidisplinary treatment team (includes the nurse, unit pharmcist,  and writer) round was conducted for this initial evaluation with the patient present. The following regarding medications was addressed during rounds with patient:  
the risks and benefits of the proposed medication. The patient was given the opportunity to ask questions. Informed consent given to the use of the above medications. I will continue to adjust psychiatric and non-psychiatric medications (see above \"medication\" section and orders section for details) as deemed appropriate & based upon diagnoses and response to treatment. I have reviewed admission (and previous/old) labs and medical tests in the EHR and or transferring hospital documents.  I will continue to order blood tests/labs and diagnostic tests as deemed appropriate and review results as they become available (see orders for details). I have reviewed old psychiatric and medical records available in the EHR. I Will order additional psychiatric records from other institutions to further elucidate the nature of patient's psychopathology and review once available. I will gather additional collateral information from friends, family and o/p treatment team to further elucidate the nature of patient's psychopathology and baselline level of psychiatric functioning. ESTIMATED LENGTH OF STAY:   
3-5 days STRENGTHS: 
Exercising self-direction/Resourceful and Access to housing/residential stability SIGNED:   
Wade Fraga MD 
12/1/2018

## 2018-12-03 LAB — TSH SERPL DL<=0.05 MIU/L-ACNC: 0.56 UIU/ML (ref 0.36–3.74)

## 2018-12-03 PROCEDURE — 65220000003 HC RM SEMIPRIVATE PSYCH

## 2018-12-03 PROCEDURE — 74011250637 HC RX REV CODE- 250/637

## 2018-12-03 PROCEDURE — 74011250637 HC RX REV CODE- 250/637: Performed by: PSYCHIATRY & NEUROLOGY

## 2018-12-03 RX ORDER — HYDROCORTISONE 1 %
CREAM (GRAM) TOPICAL 2 TIMES DAILY
Status: DISCONTINUED | OUTPATIENT
Start: 2018-12-03 | End: 2018-12-04 | Stop reason: HOSPADM

## 2018-12-03 RX ORDER — HYDROXYZINE 25 MG/1
25 TABLET, FILM COATED ORAL
Status: DISCONTINUED | OUTPATIENT
Start: 2018-12-03 | End: 2018-12-04 | Stop reason: HOSPADM

## 2018-12-03 RX ORDER — TRAZODONE HYDROCHLORIDE 50 MG/1
50 TABLET ORAL
Status: DISCONTINUED | OUTPATIENT
Start: 2018-12-03 | End: 2018-12-04 | Stop reason: HOSPADM

## 2018-12-03 RX ADMIN — IBUPROFEN 400 MG: 400 TABLET, FILM COATED ORAL at 17:24

## 2018-12-03 RX ADMIN — TRAZODONE HYDROCHLORIDE 50 MG: 50 TABLET ORAL at 22:00

## 2018-12-03 RX ADMIN — BUSPIRONE HYDROCHLORIDE 7.5 MG: 10 TABLET ORAL at 21:12

## 2018-12-03 RX ADMIN — LORAZEPAM 2 MG: 2 TABLET ORAL at 01:07

## 2018-12-03 RX ADMIN — BUSPIRONE HYDROCHLORIDE 7.5 MG: 10 TABLET ORAL at 16:57

## 2018-12-03 RX ADMIN — IBUPROFEN 400 MG: 400 TABLET, FILM COATED ORAL at 09:20

## 2018-12-03 RX ADMIN — IBUPROFEN 400 MG: 400 TABLET, FILM COATED ORAL at 00:33

## 2018-12-03 RX ADMIN — ACETAMINOPHEN 650 MG: 325 TABLET ORAL at 15:49

## 2018-12-03 RX ADMIN — GABAPENTIN 600 MG: 300 CAPSULE ORAL at 09:15

## 2018-12-03 RX ADMIN — GABAPENTIN 600 MG: 300 CAPSULE ORAL at 16:58

## 2018-12-03 RX ADMIN — HYDROXYZINE HYDROCHLORIDE 25 MG: 25 TABLET, FILM COATED ORAL at 09:20

## 2018-12-03 RX ADMIN — BUSPIRONE HYDROCHLORIDE 7.5 MG: 10 TABLET ORAL at 09:16

## 2018-12-03 RX ADMIN — HYDROXYZINE HYDROCHLORIDE 25 MG: 25 TABLET, FILM COATED ORAL at 15:48

## 2018-12-03 RX ADMIN — METHADONE HYDROCHLORIDE 15 MG: 10 TABLET ORAL at 09:16

## 2018-12-03 RX ADMIN — ACETAMINOPHEN 650 MG: 325 TABLET ORAL at 21:54

## 2018-12-03 RX ADMIN — HYDROXYZINE HYDROCHLORIDE 25 MG: 25 TABLET, FILM COATED ORAL at 22:26

## 2018-12-03 RX ADMIN — GABAPENTIN 600 MG: 300 CAPSULE ORAL at 21:12

## 2018-12-03 RX ADMIN — BUPROPION HYDROCHLORIDE 150 MG: 150 TABLET, EXTENDED RELEASE ORAL at 09:15

## 2018-12-03 NOTE — BH NOTES
Blocked Bed Documentation: 
 
Room number: 275-3 Type: Behavior Rationale: Poor Self-Control Anticipated duration: TBD Additional comments: calmer this shift , PRN meds given

## 2018-12-03 NOTE — PROGRESS NOTES
Problem: Depressed Mood (Adult/Pediatric) Goal: *STG: Verbalizes anger, guilt, and other feelings in a constructive manor Outcome: Progressing Towards Goal 
Patient initially up to nurse's station angry with staff about the medicine changes Dr Honey Theodore had made. Agitated and tearful. Blaming others. \"I never should've ended up here. Phyllis Cifuentes \"  She is then later seen seated near a female peer and talkative, smiling, friendly. Staff will continue q15 checks and encourage pt to share feelings.

## 2018-12-03 NOTE — PROGRESS NOTES
Problem: Opiate Withdrawal 
Goal: *STG: Remains safe in hospital 
Outcome: Progressing Towards Goal 
Pt remains safe in the hospital on q 15 min safety checks, COW and CIWA monitoring. Goal: *STG: Attends activities and groups Outcome: Progressing Towards Goal 
Pt attending groups and activities. Medication and meal compliant. Education provided coping skills encouraged. Problem: Depressed Mood (Adult/Pediatric) Goal: *STG: Complies with medication therapy Outcome: Progressing Towards Goal 
Anxious, sad, depressed; labile. Medication compliant. Coping skills encouraged.

## 2018-12-03 NOTE — PROGRESS NOTES
Problem: Opiate Withdrawal 
Goal: *STG: Remains safe in hospital 
Outcome: Progressing Towards Goal 
Lying quietly in bed awake , respiration even and unlabored Q15 min safety monitoring continues

## 2018-12-03 NOTE — BH NOTES
PSYCHIATRIC PROGRESS NOTE Patient Name  Maria Guadalupe Mireles Date of Birth 1985 [facility] [id number] Medical Record Number  [id number] Age  33 y.o. PCP None Admit date:  11/30/2018 Room Number  746/83  @ [facility]  
Date of Service  12/2/2018 PSYCHOTHERAPY SESSION NOTE: 
Length of psychotherapy session: 30 minutes Main condition/diagnosis/issues treated during session today, 12/2/2018 : substance abuse  Withdrawal sx, depression, treatment plan I employed Cognitive Behavioral therapy techniques, Reality-Oriented psychotherapy, as well as supportive psychotherapy in regards to various ongoing psychosocial stressors, including the following: pre-admission and current problems;legal issues; medical issues; and stress of hospitalization. Interpersonal relationship issues and psychodynamic conflicts explored. Attempts made to alleviate maladaptive patterns. We, also, worked on issues of denial & effects of substance dependency/use Overall, patient is  progressing Treatment Plan Update (reviewed an updated 12/2/2018) : I will modify psychotherapy tx plan by implementing more stress management strategies, building upon cognitive behavioral techniques, increasing coping skills, as well as shoring up psychological defenses). An extended energy and skill set was needed to engage pt in psychotherapy due to some of the following: resistiveness, complexity, negativity, confrontational nature, hostile behaviors, and/or severe abnormalities in thought processes/psychosis resulting in the loss of expressive/receptive language communication skills. E & M PROGRESS NOTE:  
 
 
HISTORY  
   
CC:  agitated HISTORY OF PRESENT ILLNESS/INTERVAL HISTORY:  (reviewed/updated 12/2/2018). per initial evaluation: The patient, Maria Guadalupe Mireles, is a 33 y.o.   WHITE [place]  female with a past psychiatric history significant for polysubstance Dependence, MDD and CICI, who presents at this time with complaints of (and/or evidence of) the following emotional symptoms: depression, anxiety, withdrawal sx. The patient reports that she was sober for almost two years until her best friend  two weeks ago and she relapsed. He has been abusing valium, opiates, alcohol, cocaine, mdma. She reports being noncompliant with her medications including wellbutrin, buspar and gabapentin prescribed by Dr. Ryan Emery. She was seen by her substance abuse counselor who brought her to the ED, with intentions of having her admitted to the . [place] [address] and then [place]. Patient motivated and interested in residential rehab program.  
 
Cassie March presents/reports/evidences the following emotional symptoms today, patient reported multiple complaints about her care, complaining of anxiety/ skin crawling. Angry because her outpatient counselor cannot have out of hours visiting although she has not spoken with him to see if he is coming. Compliant with meds- but nursing notes lots of med seeking behaviors. Requesting \":anxiety medication\", muscle relaxer, clonidine. Argumentative with staff, trying to find evidence that staff does not care about her. Threatened that she may leave AMA, but later stated that we cannnot discharge her home because she wants to go directly into rehab bed. SIDE EFFECTS: (reviewed/updated 2018) None reported or admitted to. ALLERGIES:(reviewed/updated 2018) No Known Allergies MEDICATIONS PRIOR TO ADMISSION:(reviewed/updated 2018) Medications Prior to Admission Medication Sig  
 gabapentin (NEURONTIN) 300 mg capsule Take 600 mg by mouth three (3) times daily.  busPIRone (BUSPAR) 7.5 mg tablet Take 7.5 mg by mouth three (3) times daily as needed.  buPROPion XL (WELLBUTRIN XL) 300 mg XL tablet Take 300 mg by mouth every morning.   
  
PAST MEDICAL HISTORY: Past medical history from the initial psychiatric evaluation has been reviewed (reviewed/updated 12/2/2018) with no additional updates (I asked patient and no additional past medical history provided). Past Medical History:  
Diagnosis Date  Anxiety  Depression History reviewed. No pertinent surgical history. SOCIAL HISTORY: Social history from the initial psychiatric evaluation has been reviewed (reviewed/updated 12/2/2018) with no additional updates (I asked patient and no additional social history provided). Social History Socioeconomic History  Marital status: SINGLE Spouse name: Not on file  Number of children: Not on file  Years of education: Not on file  Highest education level: Not on file Social Needs  Financial resource strain: Not on file  Food insecurity - worry: Not on file  Food insecurity - inability: Not on file  Transportation needs - medical: Not on file  Transportation needs - non-medical: Not on file Occupational History  Not on file Tobacco Use  Smoking status: Current Every Day Smoker Packs/day: 1.00 Years: 5.00 Pack years: 5.00  Smokeless tobacco: Never Used Substance and Sexual Activity  Alcohol use: Yes  Drug use: Yes Types: Heroin, Cocaine, Methamphetamines, Hallucinogenics, Benzodiazepines  Sexual activity: Not on file Other Topics Concern [address] Service Not Asked  Blood Transfusions Not Asked  Caffeine Concern Not Asked  Occupational Exposure Not Asked [place] Hazards Not Asked  Sleep Concern Not Asked  Stress Concern Not Asked  Weight Concern Not Asked  Special Diet Not Asked  Back Care Not Asked  Exercise Not Asked  Bike Helmet Not Asked  Seat Belt Not Asked  Self-Exams Not Asked Social History Narrative  Not on file FAMILY HISTORY: Family history from the initial psychiatric evaluation has been reviewed (reviewed/updated 12/2/2018) with no additional updates (I asked patient and no additional family history provided). History reviewed. No pertinent family history. REVIEW OF SYSTEMS: (reviewed/updated 12/2/2018) Appetite:good Sleep: good All other Review of Systems: Negative [place] MENTAL STATUS EXAM (MSE): MSE FINDINGS ARE WITHIN NORMAL LIMITS (WNL) UNLESS OTHERWISE STATED BELOW. ( ALL OF THE BELOW CATEGORIES OF THE MSE HAVE BEEN REVIEWED (reviewed 12/2/2018) AND UPDATED AS DEEMED APPROPRIATE ) General Presentation age appropriate and casually dressed, uncooperative and unreliable Orientation oriented to time, place and person Vital Signs  See below (reviewed 12/2/2018); Vital Signs (BP, Pulse, & Temp) are within normal limits if not listed below. Gait and Station Stable/steady, no ataxia Musculoskeletal System No extrapyramidal symptoms (EPS); no abnormal muscular movements or Tardive Dyskinesia (TD); muscle strength and tone are within normal limits Language No aphasia or dysarthria Speech:  normal pitch and normal volume Thought Processes logical; fast rate of thoughts; poor abstract reasoning/computation Thought Associations goal directed Thought Content not internally preoccupied Suicidal Ideations none Homicidal Ideations none Mood:  anxious  and depressed Affect:  anxious and depressed Memory recent  fair Memory remote:  fair Concentration/Attention:  distractable Fund of Knowledge wnl Insight:  limited Reliability poor Judgment:  limited VITALS:    
Patient Vitals for the past 24 hrs: 
 Temp Pulse Resp BP SpO2  
12/02/18 1920 98.3 °F (36.8 °C) 71 16 133/79   
12/02/18 1611 98 °F (36.7 °C) 63 16 124/80 98 % 12/02/18 1244 98.3 °F (36.8 °C) 66 16 136/88 100 % 12/02/18 0844 97.9 °F (36.6 °C) 67 16 123/84 98 % Wt Readings from Last 3 Encounters:  
12/02/18 70.6 kg (155 lb 11.2 oz) Temp Readings from Last 3 Encounters:  
12/02/18 98.3 °F (36.8 °C) BP Readings from Last 3 Encounters:  
12/02/18 133/79 Pulse Readings from Last 3 Encounters:  
12/02/18 71 DATA LABORATORY DATA:(reviewed/updated 12/2/2018) Recent Results (from the past 24 hour(s)) GLUCOSE, FASTING Collection Time: 12/02/18  5:32 AM  
Result Value Ref Range Glucose 79 65 - 100 MG/DL  
LIPID PANEL Collection Time: 12/02/18  5:32 AM  
Result Value Ref Range LIPID PROFILE Cholesterol, total 219 (H) <200 MG/DL Triglyceride 104 <150 MG/DL  
 HDL Cholesterol 59 MG/DL  
 LDL, calculated 139.2 (H) 0 - 100 MG/DL VLDL, calculated 20.8 MG/DL  
 CHOL/HDL Ratio 3.7 0 - 5.0 No results found for: VALF2, VALAC, VALP, VALPR, DS6, CRBAM, CRBAMP, CARB2, XCRBAM 
No results found for: LITHM  
RADIOLOGY REPORTS:(reviewed/updated 12/2/2018) No results found. MEDICATIONS ALL MEDICATIONS:  
Current Facility-Administered Medications Medication Dose Route Frequency  ziprasidone (GEODON) 20 mg in sterile water (preservative free) 1 mL injection  20 mg IntraMUSCular BID PRN  
 OLANZapine (ZyPREXA) tablet 5 mg  5 mg Oral Q6H PRN  
 benztropine (COGENTIN) tablet 2 mg  2 mg Oral BID PRN  
 benztropine (COGENTIN) injection 2 mg  2 mg IntraMUSCular BID PRN  
 LORazepam (ATIVAN) injection 2 mg  2 mg IntraMUSCular Q4H PRN  
 acetaminophen (TYLENOL) tablet 650 mg  650 mg Oral Q4H PRN  
 ibuprofen (MOTRIN) tablet 400 mg  400 mg Oral Q8H PRN  
 magnesium hydroxide (MILK OF MAGNESIA) 400 mg/5 mL oral suspension 30 mL  30 mL Oral DAILY PRN  
 nicotine (NICODERM CQ) 21 mg/24 hr patch 1 Patch  1 Patch TransDERmal DAILY PRN  
 methadone (DOLOPHINE) tablet 15 mg  15 mg Oral DAILY  zolpidem (AMBIEN) tablet 5 mg  5 mg Oral QHS PRN  
 gabapentin (NEURONTIN) capsule 600 mg  600 mg Oral TID  busPIRone (BUSPAR) tablet 7.5 mg  7.5 mg Oral TID  buPROPion SR (WELLBUTRIN SR) tablet 150 mg  150 mg Oral DAILY  LORazepam (ATIVAN) tablet 2 mg  2 mg Oral Q1H PRN  
  LORazepam (ATIVAN) tablet 4 mg  4 mg Oral Q1H PRN  
  
SCHEDULED MEDICATIONS:  
Current Facility-Administered Medications Medication Dose Route Frequency  methadone (DOLOPHINE) tablet 15 mg  15 mg Oral DAILY  gabapentin (NEURONTIN) capsule 600 mg  600 mg Oral TID  busPIRone (BUSPAR) tablet 7.5 mg  7.5 mg Oral TID  buPROPion SR (WELLBUTRIN SR) tablet 150 mg  150 mg Oral DAILY ASSESSMENT & PLAN  
 
DIAGNOSES REQUIRING ACTIVE TREATMENT AND MONITORING: (reviewed/updated 12/2/2018) Patient Active Hospital Problem List: 
 Major Depression (11/30/2018) Assessment: moderate to severe Plan: Agree with inpatient hospitalization for further stabilization, safety monitoring and medication management Medications- resumed home meds- wellbutring SR 150mg daily, buspirone 7.5mg TID Encourage groups Polysubstance (excluding opioids) dependence w/o physiol dependence ([facility] [address] (12/2/2018) Assessment: mod Plan: continue opiate detox Continue benzo/ alcohol detox Encourage patient to attend AA meetings In summary, Sanchez Mott, is a 33 y.o.  female who presents with a severe exacerbation of the principal diagnosis of Depression Patient's condition isimproving. Patient requires continued inpatient hospitalization for further stabilization, safety monitoring and medication management. I will continue to coordinate the provision of individual, milieu, occupational, group, and substance abuse therapies to address target symptoms/diagnoses as deemed appropriate for the individual patient. A coordinated, multidisplinary treatment team round was conducted with the patient (this team consists of the nurse, psychiatric unit pharmcist,  and writer). Complete current electronic health record for patient has been reviewed today including consultant notes, ancillary staff notes, nurses and psychiatric tech notes. Suicide risk assessment completed and patient deemed to be of low risk for suicide at this time. The following regarding medications was addressed during rounds with patient:  
the risks and benefits of the proposed medication. The patient was given the opportunity to ask questions. Informed consent given to the use of the above medications. Will continue to adjust psychiatric and non-psychiatric medications (see above \"medication\" section and orders section for details) as deemed appropriate & based upon diagnoses and response to treatment. I will continue to order blood tests/labs and diagnostic tests as deemed appropriate and review results as they become available (see orders for details and above listed lab/test results). I will order psychiatric records from previous [facility] hospitals to further elucidate the nature of patient's psychopathology and review once available. I will gather additional collateral information from friends, family and o/p treatment team to further elucidate the nature of patient's psychopathology and baselline level of psychiatric functioning. I certify that this patient's inpatient psychiatric hospital services furnished since the previous certification were, and continue to be, required for treatment that could reasonably be expected to improve the patient's condition, or for diagnostic study, and that the patient continues to need, on a daily basis, active treatment furnished directly by or requiring the supervision of inpatient psychiatric facility personnel. In addition, the hospital records show that services furnished were intensive treatment services, admission or related services, or equivalent services. EXPECTED DISCHARGE DATE/DAY: TBD  
 
DISPOSITION: Home Signed By:  
Bennie Mccall MD 
12/2/2018

## 2018-12-03 NOTE — BH NOTES
PRN Medication Documentation Specific patient behavior that led to need for PRN medication: patient asked for ambien to promote rest 
Staff interventions attempted prior to PRN being given: decrease stimuli, bedtime snack given, encouraged pt to share feelings, dim lights, redirection given as pt was just 5 mins before this screaming in her room \"fuck you! \" PRN medication given: Maria Esther Diaz Patient response/effectiveness of PRN medication: pt preparing for bed

## 2018-12-03 NOTE — BH NOTES
Staff was creating a shower list by writing the names of patients on the board. The writer asked, \"would any one else like to be added to this list?\"  The patient said, \"I'm not ready yet. \"  Reminded all patients to inform staff when they are ready to take a shower.

## 2018-12-03 NOTE — PROGRESS NOTES
NUTRITION Nutrition screening referral was triggered based on results obtained during nursing admission assessment for \"unsure wt loss. \" The patient's chart was reviewed and nutrition assessment is not indicated at this time. No wt hx available but weight WNL. Wt Readings from Last 10 Encounters:  
12/02/18 70.6 kg (155 lb 11.2 oz) Please consult if wt loss reported or noted this admit. Predict as mood improved appetite will also improve. Thank you.   
 
Micah Fowler RD

## 2018-12-03 NOTE — BH NOTES
GROUP THERAPY PROGRESS NOTE Rosette Suh did not participate in a 45 minute Acute Unit Process Group, with a focus identifying feelings, planning for the rest of the day, and discussing discharge.

## 2018-12-03 NOTE — BH NOTES
PRN Medication Documentation Specific patient behavior that led to need for PRN medication: generalized pain and increased anxiety Staff interventions attempted prior to PRN being given: decrease in stimuli, relaxation, etc.  
PRN medication given:  mg of tylenol and PO 25 mg atarax Patient response/effectiveness of PRN medication: will reassess within 1 hour and continue to monitor pt

## 2018-12-03 NOTE — BH NOTES
GROUP THERAPY PROGRESS NOTE Beatrice Haywood is participating in [place]. Group time: 15 minutes Personal goal for participation: To maintain a positive attitude. Goal orientation: personal 
 
Group therapy participation: active Therapeutic interventions reviewed and discussed: Writer listened attentive and explained the unit routine. Impression of participation: Patient participated actively.

## 2018-12-03 NOTE — INTERDISCIPLINARY ROUNDS
Behavioral Health Interdisciplinary Rounds Patient Name: Don Buchanan  Age: 29 y.o. Room/Bed:  730/02 Primary Diagnosis: Depression Admission Status: Voluntary Readmission within 30 days: no 
Power of  in place: no 
Patient requires a blocked bed: yes          Reason for blocked bed: loud outbrust , throwing things ,  
 
VTE Prophylaxis: No 
 
Mobility needs/Fall risk: no 
Flu Vaccine : no , declined Nutritional Plan: no 
Consults:       
Labs/Testing due today?: no 
 
Sleep hours:       
Participation in Care/Groups:  yes Medication Compliant?: Yes PRNS (last 24 hours): Antianxiety and Sleep Aid Restraints (last 24 hours):  no 
  
CIWA (range last 24 hours): CIWA-Ar Total: 9  
COWS (range last 24 hours): COWS Total: 3 Alcohol screening (AUDIT) completed -   AUDIT Score: 6 If applicable, date SBIRT discussed in treatment team AND documented:  
 
Tobacco - patient is a smoker: Have You Used Tobacco in the Past 30 Days: Yes Illegal Drugs use: Have You Used Any Illegal Substances Over the Past 12 Months: Yes 
 
24 hour chart check complete: yes Patient goal(s) for today:  
Treatment team focus/goals: Plan to send referral to [facility] Progress note : she has completed her detox, still very anxious LOS:  3  Expected LOS: TBD Financial concerns/prescription coverage:  
Date of last family contact:  SW spoke to her therapist    
Family requesting physician contact today:  
Discharge plan: 30 day program  
Guns in the home:no Outpatient provider(s): [facility] Participating treatment team members: Don Buchanan,  [place] Para Dr. Renetta Cifuentes

## 2018-12-03 NOTE — BH NOTES
PRN Medication Documentation 7/10 Specific patient behavior that led to need for PRN medication: restless leg discomfort Staff interventions attempted prior to PRN being given: reposition , rest  
PRN medication given: Motrin Patient response/effectiveness of PRN medication: pending PRN Medication Documentation CIWA 9 Specific patient behavior that led to need for PRN medication: restless , agitated Staff interventions attempted prior to PRN being given: 1-1 disclosed pt birthday today , upset about being in the hospital  
PRN medication given: Ativan 2 Patient response/effectiveness of PRN medication: good results noted

## 2018-12-03 NOTE — BH NOTES
\"I don't even know why I'm here if I can't get anything for detox. \" 
Patient up at med window angry with staff about meds Dr Honey Theodore has ordered.

## 2018-12-03 NOTE — BH NOTES
PRN Medication Documentation Specific patient behavior that led to need for PRN medication: pt states she wants Motrin as ordered by the doctor. Pt requests anti anxiety medication. Pt focused on Ativan. Tolerates education fair. Staff interventions attempted prior to PRN being given: education, therapeutic communcation PRN medication given: 400 mg Motrin, 25 mg Atarax Patient response/effectiveness of PRN medication: pt calm irritable

## 2018-12-03 NOTE — BH NOTES
Patient was sitting in her room; yelling, screaming and throwing her clothing and other items on the floor. Patient said, \"you all are bunch of fucking losers. This a a waste of my dammed time. You all kept telling me different shit. I made a list for my fucking clothing, he brought me some over size clothing. What the fuck! Tried to talk to patient. Patient continues to yell and scream.  Will continue to monitor. 2120 - Dr Beverly Haley notified. New order to have patient assessed if she requested to leave. 2128 - Patient agreed to stay voluntary.

## 2018-12-03 NOTE — BH NOTES
GROUP THERAPY PROGRESS NOTE Alma Piña is participating in [place]. Group time: 10 minutes Personal goal for participation: Pt stated that her day started with her frustrated but once she was able to understand information provided her the day improved. Goal orientation: personal 
 
Group therapy participation: active Therapeutic interventions reviewed and discussed: Review unit rules and reflect on each Pt's day Impression of participation: Pt was engaged in group

## 2018-12-03 NOTE — BH NOTES
2155: Patient has been complaining last two days that she does not have enough clothes and was only allowed two sets of clothing by [facility] staff upon admission. Wanted to get into her duffel bag stored on general unit to pull out more clothing items. Offered to write down items she wanted but when this staff went to the general unit on a different errand and brought clothes prior to her finishing the list, patient became upset that those were not the clothes she wanted. \"You told me to make that list. My mother packed those clothes. These are too small\". Patient anger escalated and was not receptive to staff redirection or supports. Threw items in her room on floor and was heard yelling and cursing. Staff RN's had to intervene and security staff called for supports. Agreed to take prn po meds and eventually settled down. Staff will monitor.

## 2018-12-04 VITALS
DIASTOLIC BLOOD PRESSURE: 79 MMHG | WEIGHT: 155.7 LBS | HEART RATE: 67 BPM | SYSTOLIC BLOOD PRESSURE: 124 MMHG | BODY MASS INDEX: 23.06 KG/M2 | RESPIRATION RATE: 20 BRPM | HEIGHT: 69 IN | TEMPERATURE: 98.3 F | OXYGEN SATURATION: 99 %

## 2018-12-04 PROCEDURE — 74011250637 HC RX REV CODE- 250/637: Performed by: PSYCHIATRY & NEUROLOGY

## 2018-12-04 PROCEDURE — 74011250637 HC RX REV CODE- 250/637

## 2018-12-04 RX ORDER — BUSPIRONE HYDROCHLORIDE 7.5 MG/1
7.5 TABLET ORAL
Qty: 45 TAB | Refills: 1 | Status: SHIPPED | OUTPATIENT
Start: 2018-12-04

## 2018-12-04 RX ORDER — BUSPIRONE HYDROCHLORIDE 7.5 MG/1
7.5 TABLET ORAL
Qty: 45 TAB | Refills: 0 | Status: SHIPPED | OUTPATIENT
Start: 2018-12-04 | End: 2018-12-04 | Stop reason: SDUPTHER

## 2018-12-04 RX ORDER — TRAZODONE HYDROCHLORIDE 50 MG/1
25 TABLET ORAL
Qty: 15 TAB | Refills: 1 | Status: SHIPPED | OUTPATIENT
Start: 2018-12-04

## 2018-12-04 RX ORDER — BUPROPION HYDROCHLORIDE 300 MG/1
300 TABLET ORAL
Qty: 15 TAB | Refills: 1 | Status: SHIPPED | OUTPATIENT
Start: 2018-12-04

## 2018-12-04 RX ORDER — TRAZODONE HYDROCHLORIDE 50 MG/1
25 TABLET ORAL
Qty: 15 TAB | Refills: 1 | Status: SHIPPED | OUTPATIENT
Start: 2018-12-04 | End: 2018-12-04

## 2018-12-04 RX ORDER — HYDROXYZINE 25 MG/1
25 TABLET, FILM COATED ORAL
Qty: 30 TAB | Refills: 0 | Status: SHIPPED | OUTPATIENT
Start: 2018-12-04 | End: 2018-12-04

## 2018-12-04 RX ORDER — GABAPENTIN 300 MG/1
600 CAPSULE ORAL 3 TIMES DAILY
Qty: 90 CAP | Refills: 1 | Status: SHIPPED | OUTPATIENT
Start: 2018-12-04

## 2018-12-04 RX ORDER — HYDROXYZINE 25 MG/1
25 TABLET, FILM COATED ORAL
Qty: 30 TAB | Refills: 0 | Status: SHIPPED | OUTPATIENT
Start: 2018-12-04

## 2018-12-04 RX ORDER — GABAPENTIN 300 MG/1
600 CAPSULE ORAL 3 TIMES DAILY
Qty: 90 CAP | Refills: 0 | Status: SHIPPED | OUTPATIENT
Start: 2018-12-04

## 2018-12-04 RX ORDER — BUPROPION HYDROCHLORIDE 300 MG/1
300 TABLET ORAL
Qty: 15 TAB | Refills: 0 | Status: SHIPPED | OUTPATIENT
Start: 2018-12-04 | End: 2018-12-04 | Stop reason: SDUPTHER

## 2018-12-04 RX ADMIN — BUSPIRONE HYDROCHLORIDE 7.5 MG: 10 TABLET ORAL at 08:23

## 2018-12-04 RX ADMIN — GABAPENTIN 600 MG: 300 CAPSULE ORAL at 16:34

## 2018-12-04 RX ADMIN — ACETAMINOPHEN 650 MG: 325 TABLET ORAL at 09:50

## 2018-12-04 RX ADMIN — BUPROPION HYDROCHLORIDE 150 MG: 150 TABLET, EXTENDED RELEASE ORAL at 08:22

## 2018-12-04 RX ADMIN — HYDROXYZINE HYDROCHLORIDE 25 MG: 25 TABLET, FILM COATED ORAL at 12:53

## 2018-12-04 RX ADMIN — HYDROXYZINE HYDROCHLORIDE 25 MG: 25 TABLET, FILM COATED ORAL at 08:22

## 2018-12-04 RX ADMIN — IBUPROFEN 400 MG: 400 TABLET, FILM COATED ORAL at 16:34

## 2018-12-04 RX ADMIN — IBUPROFEN 400 MG: 400 TABLET, FILM COATED ORAL at 08:22

## 2018-12-04 RX ADMIN — BUSPIRONE HYDROCHLORIDE 7.5 MG: 10 TABLET ORAL at 16:47

## 2018-12-04 RX ADMIN — GABAPENTIN 600 MG: 300 CAPSULE ORAL at 08:22

## 2018-12-04 NOTE — BH NOTES
GROUP THERAPY PROGRESS NOTE 
  
Alyne Kayser is participating in [place].  
  
Group time: 30 minutes 
  
Personal goal for participation: review goals and relaxation techniques to prepare for 
bed 
  
Goal orientation: community 
  
Group therapy participation: active 
  Therapeutic interventions reviewed and discussed: coping skills Impression of participation: \"my goal was to have a good birthday today. Whitney Brink sang happy birthday to me. I am more depressed each day I'm here. I'm detoxing so every day I feel worse. Like, the first day I was here was the easiest day. I sord of slept it off today. I want to keep my attitude in check also\" Patient shared that she likes to read and journal for relaxation. \"I didn't realize what a gift I have for writing. I have an english class and it's very easy for me\"

## 2018-12-04 NOTE — PROGRESS NOTES
Problem: Falls - Risk of 
Goal: *Absence of Falls Document April Glory Fall Risk and appropriate interventions in the flowsheet. Outcome: Progressing Towards Goal 
Fall Risk Interventions: 
  
 
  
 
Medication Interventions: Teach patient to arise slowly Pt received in bed asleep, no distress noted. Pt remains on q15min safety rounds. Will continue to maintain safety.

## 2018-12-04 NOTE — BH NOTES
Writer called to give report to Yannick at [address]. Discharge instructions have been reviewed and signed by pt. Awaiting arrival of transportation

## 2018-12-04 NOTE — BH NOTES
Pt becomes increasingly loud and argumentative during medication administration. Pt states \" I HATE THAT DOCTER! SHE IS ALWAYS CHANGING MY MEDICATIONS WITHOUT TELLING ME! SHE NEEDS TO [address] RETIRE! \" WHERE IS MY AMBIEN AND MY ANXIETY MEDICATIONS?? I'M FUCKING DETOXING!!\" When staff intervenes and asks pt to stop with disruptive behaviors pt becomes more irritable when asked to go to room. Staff informs pt that she can be discharged AMA per MD if she is unhappy with current treatment. Pt then begins to state \" I'm not leaving until tomorrow! Pt then in return asks to take scheduled medications in a much calmer manner. Staff continues to monitor pt closely for behaviors. 2150 pt asking for all of her scheduled medications. Continues to express her unhappiness in medication regimen given by MD. Staff will continue to monitor q 15 min checks.

## 2018-12-04 NOTE — DISCHARGE INSTRUCTIONS
DISCHARGE SUMMARY    NAME:Rosette Suh  : 1985  MRN: [id number]    The patient Dina Guajardo exhibits the ability to control behavior in a less restrictive environment. Patient's level of functioning is improving. No assaultive/destructive behavior has been observed for the past 24 hours. No suicidal/homicidal threat or behavior has been observed for the past 24 hours. There is no evidence of serious medication side effects. Patient has not been in physical or protective restraints for at least the past 24 hours. If weapons involved, how are they secured? No weapons involved     Is patient aware of and in agreement with discharge plan? Patient is aware of discharge and is in agreement . Arrangements for medication:  Prescriptions filled at [place] [address] of discharge instructions to  provider?: yes, [phone] for transportation home: therapist to      Keep all follow up appointments as scheduled, continue to take prescribed medications per physician instructions. Mental health crisis number:  660 or your local mental health crisis line number at [phone]     [facility] Activation    Thank you for requesting access to [facility]. Please follow the instructions below to securely access and download your online medical record. [facility] allows you to send messages to your doctor, view your test results, renew your prescriptions, schedule appointments, and more. How Do I Sign Up? 1. In your internet browser, go to [email or web address]  2. Click on the First Time User? Click Here link in the Sign In box. You will be redirect to the New Member Sign Up page. 3. Enter your [facility] Access Code exactly as it appears below. You will not need to use this code after youve completed the sign-up process. If you do not sign up before the expiration date, you must request a new code.     [facility] Access Code: [email or web address]  Expires: 2019  4:09 PM (This is the date your [facility] access code will )    4. Enter the last four digits of your Social Security Number (xxxx) and Date of Birth (mm/dd/yyyy) as indicated and click Submit. You will be taken to the next sign-up page. 5. Create a [employer] ID. This will be your [facility] [employer] [facility] login ID and cannot be changed, so think of one that is secure and easy to remember. 6. Create a [facility] password. You can change your password at any time. 7. Enter your Password Reset Question and Answer. This can be used at a later time if you forget your password. 8. Enter your e-mail address. You will receive e-mail notification when new information is available in [address] 9. Click Sign Up. You can now view and download portions of your medical record. 10. Click the Download Summary menu link to download a portable copy of your medical information. Additional Information    If you have questions, please visit the Frequently Asked Questions section of the [facility] [employer] [facility] [employer] website at [email or web address] Remember, [employer] [facility] [employer] is NOT to be used for urgent needs. For medical emergencies, dial 911.

## 2018-12-04 NOTE — BH NOTES
PRN Medication Documentation Specific patient behavior that led to need for PRN medication: increased anxiety Staff interventions attempted prior to PRN being given: decrease in stimuli, dimming lights, deep breathing, etc.   
PRN medication given: PO 25 mg atarax Patient response/effectiveness of PRN medication: will continue to monitor

## 2018-12-04 NOTE — BH NOTES
Patient angry about the medicines that have been changed. She began screaming out very loudly, yelling, cursing and was verbally abusive to staff. Nurse met with patient about what behavior is expected for voluntary patients. Redirection given, nurse encouraged pt to share her feelings without verbal abuse if possible so staff could help her. Patient angry at Dr Deb Walsh about medicines changed. Stimuli decreased Emotional support given Bedtime snack was given Asked pt if she is voluntary or she wants to be discharged. Patient yells and 
demands her medicines and to go to bed.

## 2018-12-04 NOTE — BH NOTES
GROUP THERAPY PROGRESS NOTE Sury Moreno is participating in [place]. Group time: 15 minutes Personal goal for participation: To establish an aftercare plan. Goal orientation: personal 
 
Group therapy participation: active Therapeutic interventions reviewed and discussed: Writer listen attentively and explained the unit routine. Impression of participation: Patient participated actively.

## 2018-12-04 NOTE — BH NOTES
PRN Medication Documentation Specific patient behavior that led to need for PRN medication: generalized pain Staff interventions attempted prior to PRN being given: decrease in stimuli, dimming of lights,etc 
PRN medication given:  mg of tylenol Patient response/effectiveness of PRN medication: will reassess within 1 hour

## 2018-12-04 NOTE — BH NOTES
PRN Medication Documentation At 7944 Specific patient behavior that led to need for PRN medication: c/o anxiety Staff interventions attempted prior to PRN being given: relaxation PRN medication given: atarax 25 mg po prn Patient response/effectiveness of PRN medication: at 0900 med effective per pt PRN Medication Documentation [address] Specific patient behavior that led to need for PRN medication: anxiety Staff interventions attempted prior to PRN being given: atarax 25 mg po prn PRN medication given: relaxation Patient response/effectiveness of PRN medication: at 1330 effective per pt

## 2018-12-04 NOTE — BH NOTES
GROUP THERAPY PROGRESS NOTE Karla Hoffmann is participating in Recreational Therapy. Group time: 1 hour Personal goal for participation: To find pleasure in reading poetry and working on puzzles. Goal orientation: personal 
 
Group therapy participation: active Therapeutic interventions reviewed and discussed: Writer listened attentively and explained the unit routine. Impression of participation: Patient participated actively.

## 2018-12-04 NOTE — BH NOTES
While med nurse is attempting to discuss patient's medications with her, she is repeatedly trying to engage nurse in an argument. Armin Stroud nobody gives a fuck about me here\" Angry with Dr Tatyana Coyne about medication changes made.

## 2018-12-04 NOTE — INTERDISCIPLINARY ROUNDS
Behavioral Health Interdisciplinary Rounds Patient Name: Alma Piña  Age: 29 y.o. Room/Bed:  730/02 Primary Diagnosis: Depression Admission Status: Voluntary Readmission within 30 days: no 
Power of  in place: no 
Patient requires a blocked bed: yes          Reason for blocked bed: loud outbrust , throwing things ,  
 
VTE Prophylaxis: No 
 
Mobility needs/Fall risk: no 
Flu Vaccine : no , declined Nutritional Plan: no 
Consults:       
Labs/Testing due today?: no 
 
Sleep hours:  7.5 Participation in Care/Groups:  yes Medication Compliant?: Yes PRNS (last 24 hours): antianxiety Restraints (last 24 hours):  no 
  
CIWA (range last 24 hours): COWS (range last 24 hours): Alcohol screening (AUDIT) completed -   AUDIT Score: 6 If applicable, date SBIRT discussed in treatment team AND documented:  
 
Tobacco - patient is a smoker: Have You Used Tobacco in the Past 30 Days: Yes Illegal Drugs use: Have You Used Any Illegal Substances Over the Past 12 Months: Yes 
 
24 hour chart check complete: yes Patient goal(s) for today:  
Treatment team focus/goals: Plan for discharge today [facility] Progress note : Plan for discharge today to [facility]. LOS:    Expected LOS: TBD Financial concerns/prescription coverage: no benefits Date of last family contact:  SW spoke to her therapist    
Family requesting physician contact today:  
Discharge plan: 30 day program  
Guns in the home:no Outpatient provider(s): [facility] Participating treatment team members: Dr. Deb Zaragoza - Sulaiman Gaston, JUAN CARLOS- MADDY Howell -

## 2018-12-05 NOTE — BH NOTES
Behavioral Health Transition Record to Provider Patient Name: Maria Guadalupe Mireles YOB: 1985 Medical Record Number: [id number] Date of Admission: 11/30/2018 Date of Discharge: 12/5/2018 Attending Provider: No att. providers found Discharging Provider: Dr. Julia Bernard To contact this individual call [phone] and ask the  to page. If unavailable, ask to be transferred to [facility] Provider on call. [facility] Provider will be available on call 24/7 and during holidays. Primary Care Provider: None No Known Allergies Reason for Admission: \"Wanted detox/ rehab\". Pt admitted on a voluntary basis for severe depression, anxiety, substance abuse and SI Admission Diagnosis: Depression [F32.9] * No surgery found * Results for orders placed or performed during the hospital encounter of 11/30/18 CBC WITH AUTOMATED DIFF Result Value Ref Range WBC 7.5 3.6 - 11.0 K/uL  
 RBC 4.24 3.80 - 5.20 M/uL  
 HGB 13.9 11.5 - 16.0 g/dL HCT 41.2 35.0 - 47.0 % MCV 97.2 80.0 - 99.0 FL  
 MCH 32.8 26.0 - 34.0 PG  
 MCHC 33.7 30.0 - 36.5 g/dL  
 RDW 12.1 11.5 - 14.5 % PLATELET 583 226 - 923 K/uL MPV 9.9 8.9 - 12.9 FL  
 NRBC 0.0 0  WBC ABSOLUTE NRBC 0.00 0.00 - 0.01 K/uL NEUTROPHILS 67 32 - 75 % LYMPHOCYTES 26 12 - 49 % MONOCYTES 5 5 - 13 % EOSINOPHILS 1 0 - 7 % BASOPHILS 1 0 - 1 % IMMATURE GRANULOCYTES 0 0.0 - 0.5 % ABS. NEUTROPHILS 5.0 1.8 - 8.0 K/UL  
 ABS. LYMPHOCYTES 1.9 0.8 - 3.5 K/UL  
 ABS. MONOCYTES 0.4 0.0 - 1.0 K/UL  
 ABS. EOSINOPHILS 0.1 0.0 - 0.4 K/UL  
 ABS. BASOPHILS 0.1 0.0 - 0.1 K/UL  
 ABS. IMM. GRANS. 0.0 0.00 - 0.04 K/UL  
 DF AUTOMATED METABOLIC PANEL, COMPREHENSIVE Result Value Ref Range Sodium 141 136 - 145 mmol/L Potassium 4.0 3.5 - 5.1 mmol/L Chloride 108 97 - 108 mmol/L  
 CO2 25 21 - 32 mmol/L Anion gap 8 5 - 15 mmol/L Glucose 109 (H) 65 - 100 mg/dL  BUN 7 6 - 20 MG/DL  
 Creatinine 0.71 0.55 - 1.02 MG/DL  
 BUN/Creatinine ratio 10 (L) 12 - 20 GFR est AA >60 >60 ml/min/1.73m2 GFR est non-AA >60 >60 ml/min/1.73m2 Calcium 8.8 8.5 - 10.1 MG/DL Bilirubin, total 0.3 0.2 - 1.0 MG/DL  
 ALT (SGPT) 19 12 - 78 U/L  
 AST (SGOT) 11 (L) 15 - 37 U/L Alk. phosphatase 60 45 - 117 U/L Protein, total 8.1 6.4 - 8.2 g/dL Albumin 4.1 3.5 - 5.0 g/dL Globulin 4.0 2.0 - 4.0 g/dL A-G Ratio 1.0 (L) 1.1 - 2.2 ETHYL ALCOHOL Result Value Ref Range ALCOHOL(ETHYL),SERUM <10 <10 MG/DL  
DRUG SCREEN, URINE Result Value Ref Range AMPHETAMINES NEGATIVE  NEG    
 BARBITURATES NEGATIVE  NEG BENZODIAZEPINES POSITIVE (A) NEG    
 COCAINE NEGATIVE  NEG METHADONE NEGATIVE  NEG    
 OPIATES POSITIVE (A) NEG    
 PCP(PHENCYCLIDINE) NEGATIVE  NEG    
 THC (TH-CANNABINOL) NEGATIVE  NEG Drug screen comment (NOTE) SALICYLATE Result Value Ref Range Salicylate level <7.2 (L) 2.8 - 20.0 MG/DL  
ACETAMINOPHEN Result Value Ref Range Acetaminophen level <2 (L) 10 - 30 ug/mL URINALYSIS W/ RFLX MICROSCOPIC Result Value Ref Range Color YELLOW/STRAW Appearance CLEAR CLEAR Specific gravity 1.007 1.003 - 1.030    
 pH (UA) 7.5 5.0 - 8.0 Protein NEGATIVE  NEG mg/dL Glucose NEGATIVE  NEG mg/dL Ketone NEGATIVE  NEG mg/dL Bilirubin NEGATIVE  NEG Blood LARGE (A) NEG Urobilinogen 0.2 0.2 - 1.0 EU/dL Nitrites NEGATIVE  NEG Leukocyte Esterase NEGATIVE  NEG    
 WBC 0-4 0 - 4 /hpf  
 RBC 0-5 0 - 5 /hpf Epithelial cells FEW FEW /lpf Bacteria NEGATIVE  NEG /hpf  
GLUCOSE, FASTING Result Value Ref Range Glucose 79 65 - 100 MG/DL  
LIPID PANEL Result Value Ref Range LIPID PROFILE Cholesterol, total 219 (H) <200 MG/DL Triglyceride 104 <150 MG/DL  
 HDL Cholesterol 59 MG/DL  
 LDL, calculated 139.2 (H) 0 - 100 MG/DL VLDL, calculated 20.8 MG/DL  
 CHOL/HDL Ratio 3.7 0 - 5.0 TSH 3RD GENERATION  
 Result Value Ref Range TSH 0.56 0.36 - 3.74 uIU/mL  
HCG URINE, QL. - POC Result Value Ref Range Pregnancy test,urine (POC) NEGATIVE  NEG Immunizations administered during this encounter: There is no immunization history on file for this patient. Screening for Metabolic Disorders for Patients on Antipsychotic Medications 
(Data obtained from the EMR) Estimated Body Mass Index Estimated body mass index is 22.99 kg/m² as calculated from the following: 
  Height as of this encounter: 5' 9\" (1.753 m). Weight as of this encounter: 70.6 kg (155 lb 11.2 oz). Vital Signs/Blood Pressure Visit Vitals /79 (BP 1 Location: Left arm, BP Patient Position: Sitting) Pulse 67 Temp 98.3 °F (36.8 °C) Resp 20 Ht 5' 9\" (1.753 m) Wt 70.6 kg (155 lb 11.2 oz) LMP 11/28/2018 SpO2 99% Breastfeeding? No  
BMI 22.99 kg/m² Blood Glucose/Hemoglobin A1c Lab Results Component Value Date/Time Glucose 79 12/02/2018 05:32 AM  
 
No results found for: HBA1C, HGBE8, [id number] Lipid Panel Lab Results Component Value Date/Time Cholesterol, total 219 (H) 12/02/2018 05:32 AM  
 HDL Cholesterol 59 12/02/2018 05:32 AM  
 LDL, calculated 139.2 (H) 12/02/2018 05:32 AM  
 Triglyceride 104 12/02/2018 05:32 AM  
 CHOL/HDL Ratio 3.7 12/02/2018 05:32 AM  
 
  
Discharge Diagnosis: Depression / Polysubstance Abuse Discharge Plan: She will be discharge to [place] [address]. The patient Zita Meza exhibits the ability to control behavior in a less restrictive environment. Patient's level of functioning is improving. No assaultive/destructive behavior has been observed for the past 24 hours. No suicidal/homicidal threat or behavior has been observed for the past 24 hours. There is no evidence of serious medication side effects. Patient has not been in physical or protective restraints for at least the past 24 hours. If weapons involved, how are they secured? No weapons involved Is patient aware of and in agreement with discharge plan? Patient is aware of discharge and is in agreement . Arrangements for medication:  Prescriptions filled at [facility] Copy of discharge instructions to  provider?: yes, [phone] Arrangements for transportation home: therapist to  Keep all follow up appointments as scheduled, continue to take prescribed medications per physician instructions. Mental health crisis number:  [phone] or your local mental health crisis line number at [phone] Discharge Medication List and Instructions:  
Discharge Medication List as of 12/4/2018  5:17 PM  
  
CONTINUE these medications which have CHANGED Details  
!! gabapentin (NEURONTIN) 300 mg capsule Take 2 Caps by mouth three (3) times daily. , Normal, Disp-90 Cap, R-0  
  
buPROPion XL (WELLBUTRIN XL) 300 mg XL tablet Take 1 Tab by mouth every morning., Print, Disp-15 Tab, R-1  
  
busPIRone (BUSPAR) 7.5 mg tablet Take 1 Tab by mouth three (3) times daily as needed. , Print, Disp-45 Tab, R-1  
  
!! gabapentin (NEURONTIN) 300 mg capsule Take 2 Caps by mouth three (3) times daily. , Print, Disp-90 Cap, R-1  
  
hydrOXYzine HCl (ATARAX) 25 mg tablet Take 1 Tab by mouth every six (6) hours as needed (1st line anxiety, agitation). , Print, Disp-30 Tab, R-0  
  
traZODone (DESYREL) 50 mg tablet Take 0.5 Tabs by mouth nightly. , Print, Disp-15 Tab, R-1  
  
 !! - Potential duplicate medications found. Please discuss with provider. Unresulted Labs (24h ago, onward) None To obtain results of studies pending at discharge, please contact [phone] Follow-up Information Follow up With Specialties Details Why Contact Info Plan to discharge to [facility]   On 12/4/2018  Marnie Silva , [address]  
[phone] None    None ([phone]) Patient stated that they have no PCP Advanced Directive:  
Does the patient have an appointed surrogate decision maker?  No 
 Does the patient have a Medical Advance Directive? No 
Does the patient have a Psychiatric Advance Directive? No 
If the patient does not have a surrogate or Medical Advance Directive AND Psychiatric Advance Directive, the patient was offered information on these advance directives Patient declined to complete Patient Instructions: Please continue all medications until otherwise directed by physician. Tobacco Cessation Discharge Plan:  
Is the patient a smoker and needs referral for smoking cessation? Yes Patient referred to the following for smoking cessation with an appointment? Refused Patient was offered medication to assist with smoking cessation at discharge? Refused Was education for smoking cessation added to the discharge instructions? Yes Alcohol/Substance Abuse Discharge Plan:  
Does the patient have a history of substance/alcohol abuse and requires a referral for treatment? Yes Patient referred to the following for substance/alcohol abuse treatment with an appointment? Yes Patient was offered medication to assist with alcohol cessation at discharge? No 
Was education for substance/alcohol abuse added to discharge instructions? No 
 
Patient discharged to Home; discussed with patient/caregiver and provided to the patient/caregiver either in hard copy or electronically.

## 2018-12-18 NOTE — DISCHARGE SUMMARY
PSYCHIATRIC DISCHARGE SUMMARY         IDENTIFICATION:    Patient Name  Kelsi Sarkar   Date of Birth 1985   [facility] [id number]   Medical Record Number  [id number]      Age  29 y.o. PCP None   Admit date:  2018    Discharge date: 18   Room Number  730/02  @ [facility]   Date of Service  18            TYPE OF DISCHARGE: REGULAR               CONDITION AT DISCHARGE: improved       PROVISIONAL & DISCHARGE DIAGNOSES:    Problem List  Never Reviewed          Codes Class    Polysubstance (excluding opioids) dependence w/o physiol dependence ([facility] [address] ICD-10-CM: F19.20  ICD-9-CM: 304.80         * (Principal) Depression ICD-10-CM: F32.9  ICD-9-CM: [address] Problems    Polysubstance (excluding opioids) dependence w/o physiol dependence ([facility] [address]      *Depression        DISCHARGE DIAGNOSIS:   Axis I:  SEE ABOVE  Axis II: SEE ABOVE  Axis III: SEE ABOVE  Axis IV:  lack of structure  Axis V:  45 on admission, 55on discharge 65(baseline)       CC & HISTORY OF PRESENT ILLNESS:  The patient, Kelsi Sarkar, is a 33 y.o. WHITE [place]  female with a past psychiatric history significant for polysubstance Dependence, MDD and CICI, who presents at this time with complaints of (and/or evidence of) the following emotional symptoms: depression, anxiety, withdrawal sx. The patient reports that she was sober for almost two years until her best friend  two weeks ago and she relapsed. She has been abusing valium, opiates, alcohol, cocaine, mdma. She reports being noncompliant with her medications including wellbutrin, buspar and gabapentin prescribed by Dr. Chapin Schultz. She was seen by her substance abuse counselor who brought her to the ED, with intentions of having her admitted to the . [place] [address] and then [place].    Patient motivated and interested in residential rehab program.         SOCIAL HISTORY:    Social History     Socioeconomic History    Marital status: SINGLE     Spouse name: Not on file    Number of children: Not on file    Years of education: Not on file    Highest education level: Not on file   Social Needs    Financial resource strain: Not on file    Food insecurity - worry: Not on file    Food insecurity - inability: Not on file    Transportation needs - medical: Not on file   [employer] needs - non-medical: Not on file   Occupational History    Not on file   Tobacco Use    Smoking status: Current Every Day Smoker     Packs/day: 1.00     Years: 5.00     Pack years: 5.00    Smokeless tobacco: Never Used   Substance and Sexual Activity    Alcohol use: Yes    Drug use: Yes     Types: Heroin, Cocaine, Methamphetamines, Hallucinogenics, Benzodiazepines    Sexual activity: Not on file   Other Topics Concern     Service Not Asked    Blood Transfusions Not Asked    Caffeine Concern Not Asked    Occupational Exposure Not Asked    Hobby Hazards Not Asked    Sleep Concern Not Asked    Stress Concern Not Asked    Weight Concern Not Asked    Special Diet Not Asked    Back Care Not Asked    Exercise Not Asked    Bike Helmet Not Asked   [address] Not Asked    Self-Exams Not Asked   Social History Narrative    Not on file      FAMILY HISTORY:   History reviewed. No pertinent family history. HOSPITALIZATION COURSE:    Rosette Suh was admitted to the inpatient psychiatric unit [facility] for acute psychiatric stabilization in regards to symptomatology as described in the HPI above. The differential diagnosis at time of admission included: bipolar dis vs. schizoaffective vs. Schizophrenia. While on the unit Rosette Brand was involved in individual, group, occupational and milieu therapy. Psychiatric medications were adjusted during this hospitalization including (see below). Rosette Suh demonstrated a slow, but progressive improvement in overall condition.  Very argumentative  And manipulative during hospital stay, unable to collaborate with treatment team.  Much of patient's depression appeared to be related to situational stressors and psychological factors. Please see individual progress notes for more specific details regarding patient's hospitalization course. At time of discharge, Dina Guajardo is without significant problems of depression or SI . Patient free of suicidal and homicidal ideations (appears to be at very low risk of suicide or homicide) and reports many positive predictive factors in terms of not attempting suicide or homicide. Overall presentation at time of discharge is most consistent with the diagnosis of Polysubstance dependence. Patient with request for discharge today. There are no grounds to seek a TDO.               LABS AND IMAGING:    Labs Reviewed   METABOLIC PANEL, COMPREHENSIVE - Abnormal; Notable for the following components:       Result Value    Glucose 109 (*)     BUN/Creatinine ratio 10 (*)     AST (SGOT) 11 (*)     A-G Ratio 1.0 (*)     All other components within normal limits   DRUG SCREEN, URINE - Abnormal; Notable for the following components:    BENZODIAZEPINES POSITIVE (*)     OPIATES POSITIVE (*)     All other components within normal limits   SALICYLATE - Abnormal; Notable for the following components:    Salicylate level <1.2 (*)     All other components within normal limits   ACETAMINOPHEN - Abnormal; Notable for the following components:    Acetaminophen level <2 (*)     All other components within normal limits   URINALYSIS W/ RFLX MICROSCOPIC - Abnormal; Notable for the following components:    Blood LARGE (*)     All other components within normal limits   LIPID PANEL - Abnormal; Notable for the following components:    Cholesterol, total 219 (*)     LDL, calculated 139.2 (*)     All other components within normal limits   CBC WITH AUTOMATED DIFF   ETHYL ALCOHOL   GLUCOSE, FASTING   TSH 3RD GENERATION   HCG URINE, QL. - POC     No results found for: DS35, PHEN, PHENO, PHENT, DILF, DS39, PHENY, PTN, VALF2, VALAC, VALP, VALPR, DS6, CRBAM, CRBAMP, CARB2, XCRBAM  Admission on 11/30/2018, Discharged on 12/04/2018   Component Date Value Ref Range Status    WBC 11/30/2018 7.5  3.6 - 11.0 K/uL Final    RBC 11/30/2018 4.24  3.80 - 5.20 M/uL Final    HGB 11/30/2018 13.9  11.5 - 16.0 g/dL Final    HCT 11/30/2018 41.2  35.0 - 47.0 % Final    MCV 11/30/2018 97.2  80.0 - 99.0 FL Final    MCH 11/30/2018 32.8  26.0 - 34.0 PG Final    MCHC 11/30/2018 33.7  30.0 - 36.5 g/dL Final    RDW 11/30/2018 12.1  11.5 - 14.5 % Final    PLATELET 79/35/8542 393  150 - 400 K/uL Final    MPV 11/30/2018 9.9  8.9 - 12.9 FL Final    NRBC 11/30/2018 0.0  0  WBC Final    ABSOLUTE NRBC 11/30/2018 0.00  0.00 - 0.01 K/uL Final    NEUTROPHILS 11/30/2018 67  32 - 75 % Final    LYMPHOCYTES 11/30/2018 26  12 - 49 % Final    MONOCYTES 11/30/2018 5  5 - 13 % Final    EOSINOPHILS 11/30/2018 1  0 - 7 % Final    BASOPHILS 11/30/2018 1  0 - 1 % Final    IMMATURE GRANULOCYTES 11/30/2018 0  0.0 - 0.5 % Final    ABS. NEUTROPHILS 11/30/2018 5.0  1.8 - 8.0 K/UL Final    ABS. LYMPHOCYTES 11/30/2018 1.9  0.8 - 3.5 K/UL Final    ABS. MONOCYTES 11/30/2018 0.4  0.0 - 1.0 K/UL Final    ABS. EOSINOPHILS 11/30/2018 0.1  0.0 - 0.4 K/UL Final    ABS. BASOPHILS 11/30/2018 0.1  0.0 - 0.1 K/UL Final    ABS. IMM. GRANS.  11/30/2018 0.0  0.00 - 0.04 K/UL Final    DF 11/30/2018 AUTOMATED    Final    Sodium 11/30/2018 141  136 - 145 mmol/L Final    Potassium 11/30/2018 4.0  3.5 - 5.1 mmol/L Final    Chloride 11/30/2018 108  97 - 108 mmol/L Final    CO2 11/30/2018 25  21 - 32 mmol/L Final    Anion gap 11/30/2018 8  5 - 15 mmol/L Final    Glucose 11/30/2018 109* 65 - 100 mg/dL Final    BUN 11/30/2018 7  6 - 20 MG/DL Final    Creatinine 11/30/2018 0.71  0.55 - 1.02 MG/DL Final    BUN/Creatinine ratio 11/30/2018 10* 12 - 20   Final    GFR est AA 11/30/2018 >60  >60 ml/min/1.73m2 Final    GFR est non-AA 11/30/2018 >60 >60 ml/min/1.73m2 Final    Calcium 11/30/2018 8.8  8.5 - 10.1 MG/DL Final    Bilirubin, total 11/30/2018 0.3  0.2 - 1.0 MG/DL Final    ALT (SGPT) 11/30/2018 19  12 - 78 U/L Final    AST (SGOT) 11/30/2018 11* 15 - 37 U/L Final    Alk.  phosphatase 11/30/2018 60  45 - 117 U/L Final    Protein, total 11/30/2018 8.1  6.4 - 8.2 g/dL Final    Albumin 11/30/2018 4.1  3.5 - 5.0 g/dL Final    Globulin 11/30/2018 4.0  2.0 - 4.0 g/dL Final    A-G Ratio 11/30/2018 1.0* 1.1 - 2.2   Final    ALCOHOL(ETHYL),SERUM 11/30/2018 <10  <10 MG/DL Final    AMPHETAMINES 11/30/2018 NEGATIVE   NEG   Final    BARBITURATES 11/30/2018 NEGATIVE   NEG   Final    BENZODIAZEPINES 11/30/2018 POSITIVE* NEG   Final    COCAINE 11/30/2018 NEGATIVE   NEG   Final    METHADONE 11/30/2018 NEGATIVE   NEG   Final    OPIATES 11/30/2018 POSITIVE* NEG   Final    PCP(PHENCYCLIDINE) 11/30/2018 NEGATIVE   NEG   Final    THC (TH-CANNABINOL) 11/30/2018 NEGATIVE   NEG   Final    Drug screen comment 11/30/2018 (NOTE)   Final    Salicylate level 25/99/3659 <1.7* 2.8 - 20.0 MG/DL Final    Acetaminophen level 11/30/2018 <2* 10 - 30 ug/mL Final    Color 11/30/2018 YELLOW/STRAW    Final    Appearance 11/30/2018 CLEAR  CLEAR   Final    Specific gravity 11/30/2018 1.007  1.003 - 1.030   Final    pH (UA) 11/30/2018 7.5  5.0 - 8.0   Final    Protein 11/30/2018 NEGATIVE   NEG mg/dL Final    Glucose 11/30/2018 NEGATIVE   NEG mg/dL Final    Ketone 11/30/2018 NEGATIVE   NEG mg/dL Final    Bilirubin 11/30/2018 NEGATIVE   NEG   Final    Blood 11/30/2018 LARGE* NEG   Final    Urobilinogen 11/30/2018 0.2  0.2 - 1.0 EU/dL Final    Nitrites 11/30/2018 NEGATIVE   NEG   Final    Leukocyte Esterase 11/30/2018 NEGATIVE   NEG   Final    WBC 11/30/2018 0-4  0 - 4 /hpf Final    RBC 11/30/2018 0-5  0 - 5 /hpf Final    Epithelial cells 11/30/2018 FEW  FEW /lpf Final    Bacteria 11/30/2018 NEGATIVE   NEG /hpf Final    Pregnancy test,urine (POC) 11/30/2018 NEGATIVE   NEG   Final    Glucose 12/02/2018 79  65 - 100 MG/DL Final    LIPID PROFILE 12/02/2018        Final    Cholesterol, total 12/02/2018 219* <200 MG/DL Final    Triglyceride 12/02/2018 104  <150 MG/DL Final    HDL Cholesterol 12/02/2018 59  MG/DL Final    LDL, calculated 12/02/2018 139.2* 0 - 100 MG/DL Final    VLDL, calculated 12/02/2018 20.8  MG/DL Final    CHOL/HDL Ratio 12/02/2018 3.7  0 - 5.0   Final    TSH 12/02/2018 0.56  0.36 - 3.74 uIU/mL Final     No results found. DISPOSITION:    Home. Patient to f/u with psychiatric and psychotherapy appointments. Patient is to f/u with internist as directed. FOLLOW-UP CARE:    Activity as tolerated  Regular Diet  Wound Care: none needed. Follow-up Information     Follow up With Specialties Details Why Contact Info    Plan to discharge to [facility]   On 12/4/2018  [address] [facility] , [address] [facility] [place]     None    None ([phone]) Patient stated that they have no PCP                   PROGNOSIS:   Good--- based on nature of patient's pathology/ies and treatment compliance issues. Prognosis is greatly dependent upon patient's ability to remain sober and to follow up with drug/etoh rehabilitation and psychiatric/psychotherapy appointments as well as to comply with psychiatric medications as prescribed. DISCHARGE MEDICATIONS:   Informed consent given for the use of following psychotropic medications:  Discharge Medication List as of 12/4/2018  5:17 PM      CONTINUE these medications which have CHANGED    Details   !! gabapentin (NEURONTIN) 300 mg capsule Take 2 Caps by mouth three (3) times daily. , Normal, Disp-90 Cap, R-0      buPROPion XL (WELLBUTRIN XL) 300 mg XL tablet Take 1 Tab by mouth every morning., Print, Disp-15 Tab, R-1      busPIRone (BUSPAR) 7.5 mg tablet Take 1 Tab by mouth three (3) times daily as needed. , Print, Disp-45 Tab, R-1      !! gabapentin (NEURONTIN) 300 mg capsule Take 2 Caps by mouth three (3) times daily. , Print, Disp-90 Cap, R-1      hydrOXYzine HCl (ATARAX) 25 mg tablet Take 1 Tab by mouth every six (6) hours as needed (1st line anxiety, agitation). , Print, Disp-30 Tab, R-0      traZODone (DESYREL) 50 mg tablet Take 0.5 Tabs by mouth nightly. , Print, Disp-15 Tab, R-1       !! - Potential duplicate medications found. Please discuss with provider. A coordinated, multidisplinary treatment team round was conducted with Yasmine Suh---this is done daily here at [facility]. This team consists of the nurse, psychiatric unit pharmcist,  and writer. I have spent greater than 35 minutes on discharge work.     Signed:  Gris Eaton MD  12/4/18

## 2022-08-04 NOTE — PROGRESS NOTES
Problem: Opiate Withdrawal 
Goal: *STG: Remains safe in hospital 
Outcome: Resolved/Met Date Met: 12/04/18 Pt denies any suicidal or homicidal thoughts. Contracts for safety. Remains on Q 15 min safety checks. No agitation or aggression observed. Goal: *STG: Attends activities and groups Outcome: Progressing Towards Goal 
Has attended groups and activities with appropriate interactions, 
Goal: *STG: Verbalizes abstinence as an achievable goal 
Outcome: Progressing Towards Goal 
Variance: Patient slowly responding but states concern about obtaining goal of absenteeism What Type Of Note Output Would You Prefer (Optional)?: Bullet Format How Severe Is Your Skin Lesion?: moderate Has Your Skin Lesion Been Treated?: not been treated Is This A New Presentation, Or A Follow-Up?: Skin Lesion No

## 2023-02-07 NOTE — BH NOTES
PSYCHIATRIC PROGRESS NOTE Patient Name  Don uBchanan Date of Birth 1985 [facility] [id number] Medical Record Number  [id number] Age  29 y.o. PCP None Admit date:  11/30/2018 Room Number  090/32  @ [facility]  
Date of Service  12/3/2018 PSYCHOTHERAPY SESSION NOTE: 
Length of psychotherapy session: 30 minutes Main condition/diagnosis/issues treated during session today, 12/3/2018 : substance abuse  Withdrawal sx, depression, treatment plan I employed Cognitive Behavioral therapy techniques, Reality-Oriented psychotherapy, as well as supportive psychotherapy in regards to various ongoing psychosocial stressors, including the following: pre-admission and current problems;legal issues; medical issues; and stress of hospitalization. Interpersonal relationship issues and psychodynamic conflicts explored. Attempts made to alleviate maladaptive patterns. We, also, worked on issues of denial & effects of substance dependency/use Overall, patient is  progressing Treatment Plan Update (reviewed an updated 12/3/2018) : I will modify psychotherapy tx plan by implementing more stress management strategies, building upon cognitive behavioral techniques, increasing coping skills, as well as shoring up psychological defenses). An extended energy and skill set was needed to engage pt in psychotherapy due to some of the following: resistiveness, complexity, negativity, confrontational nature, hostile behaviors, and/or severe abnormalities in thought processes/psychosis resulting in the loss of expressive/receptive language communication skills. E & M PROGRESS NOTE:  
 
 
HISTORY  
   
CC:  agitatedHISTORY OF PRESENT ILLNESS/INTERVAL HISTORY:  (reviewed/updated 12/3/2018). per initial evaluation: The patient, Don Buchanan, is a 33 y.o.   WHITE [place]  female with a past psychiatric history significant for polysubstance Dependence, SANDRA re: scheduling device check/cardiologist appt   MDD and CICI, who presents at this time with complaints of (and/or evidence of) the following emotional symptoms: depression, anxiety, withdrawal sx. The patient reports that she was sober for almost two years until her best friend  two weeks ago and she relapsed. He has been abusing valium, opiates, alcohol, cocaine, mdma. She reports being noncompliant with her medications including wellbutrin, buspar and gabapentin prescribed by Dr. Yodit May. She was seen by her substance abuse counselor who brought her to the ED, with intentions of having her admitted to the . [place] [address] and then [place]. Patient motivated and interested in residential rehab program.  
 
Hammad Diamond presents/reports/evidences the following emotional symptoms today, complaints of continued withdrawal sx. Very hostile communication with staff, lots of accusations, blaming behavior. Limited accountability on her part. Only slept 2 hours. VSS. No n/v/d/tremors SIDE EFFECTS: (reviewed/updated 12/3/2018) None reported or admitted to. ALLERGIES:(reviewed/updated 12/3/2018) No Known Allergies MEDICATIONS PRIOR TO ADMISSION:(reviewed/updated 12/3/2018) Medications Prior to Admission Medication Sig  
 gabapentin (NEURONTIN) 300 mg capsule Take 600 mg by mouth three (3) times daily.  busPIRone (BUSPAR) 7.5 mg tablet Take 7.5 mg by mouth three (3) times daily as needed.  buPROPion XL (WELLBUTRIN XL) 300 mg XL tablet Take 300 mg by mouth every morning. PAST MEDICAL HISTORY: Past medical history from the initial psychiatric evaluation has been reviewed (reviewed/updated 12/3/2018) with no additional updates (I asked patient and no additional past medical history provided). Past Medical History:  
Diagnosis Date  Anxiety  Depression History reviewed. No pertinent surgical history.   
SOCIAL HISTORY: Social history from the initial psychiatric evaluation has been reviewed (reviewed/updated 12/3/2018) with no additional updates (I asked patient and no additional social history provided). Social History Socioeconomic History  Marital status: SINGLE Spouse name: Not on file  Number of children: Not on file  Years of education: Not on file  Highest education level: Not on file Social Needs  Financial resource strain: Not on file  Food insecurity - worry: Not on file  Food insecurity - inability: Not on file  Transportation needs - medical: Not on file  Transportation needs - non-medical: Not on file Occupational History  Not on file Tobacco Use  Smoking status: Current Every Day Smoker Packs/day: 1.00 Years: 5.00 Pack years: 5.00  Smokeless tobacco: Never Used Substance and Sexual Activity  Alcohol use: Yes  Drug use: Yes Types: Heroin, Cocaine, Methamphetamines, Hallucinogenics, Benzodiazepines  Sexual activity: Not on file Other Topics Concern [address] Service Not Asked  Blood Transfusions Not Asked  Caffeine Concern Not Asked  Occupational Exposure Not Asked Judene Nash Hazards Not Asked  Sleep Concern Not Asked  Stress Concern Not Asked  Weight Concern Not Asked  Special Diet Not Asked  Back Care Not Asked  Exercise Not Asked  Bike Helmet Not Asked  Seat Belt Not Asked  Self-Exams Not Asked Social History Narrative  Not on file FAMILY HISTORY: Family history from the initial psychiatric evaluation has been reviewed (reviewed/updated 12/3/2018) with no additional updates (I asked patient and no additional family history provided). History reviewed. No pertinent family history. REVIEW OF SYSTEMS: (reviewed/updated 12/3/2018) Appetite:good Sleep: good All other Review of Systems: Negative [place] MENTAL STATUS EXAM (MSE): MSE FINDINGS ARE WITHIN NORMAL LIMITS (WNL) UNLESS OTHERWISE STATED BELOW. ( ALL OF THE BELOW CATEGORIES OF THE MSE HAVE BEEN REVIEWED (reviewed 12/3/2018) AND UPDATED AS DEEMED APPROPRIATE ) General Presentation age appropriate and casually dressed, uncooperative and unreliable Orientation oriented to time, place and person Vital Signs  See below (reviewed 12/3/2018); Vital Signs (BP, Pulse, & Temp) are within normal limits if not listed below. Gait and Station Stable/steady, no ataxia Musculoskeletal System No extrapyramidal symptoms (EPS); no abnormal muscular movements or Tardive Dyskinesia (TD); muscle strength and tone are within normal limits Language No aphasia or dysarthria Speech:  normal pitch and normal volume Thought Processes logical; fast rate of thoughts; poor abstract reasoning/computation Thought Associations goal directed Thought Content not internally preoccupied Suicidal Ideations none Homicidal Ideations none Mood:  anxious  and depressed Affect:  anxious and depressed Memory recent  fair Memory remote:  fair Concentration/Attention:  distractable Fund of Knowledge wnl Insight:  limited Reliability poor Judgment:  limited VITALS:    
Patient Vitals for the past 24 hrs: 
 Temp Pulse Resp BP SpO2  
12/03/18 1948 97.8 °F (36.6 °C) 72 16 115/77 98 % 12/03/18 1553 97.6 °F (36.4 °C) 77 16 131/80 100 % 12/03/18 1221 98.1 °F (36.7 °C) 75 16 127/86   
12/03/18 0856 97.8 °F (36.6 °C) 82 16 138/81   
12/03/18 0113 98 °F (36.7 °C) 64 16 138/86  Wt Readings from Last 3 Encounters:  
12/02/18 70.6 kg (155 lb 11.2 oz) Temp Readings from Last 3 Encounters:  
12/03/18 97.8 °F (36.6 °C) BP Readings from Last 3 Encounters:  
12/03/18 115/77 Pulse Readings from Last 3 Encounters:  
12/03/18 72 DATA LABORATORY DATA:(reviewed/updated 12/3/2018) No results found for this or any previous visit (from the past 24 hour(s)).  
No results found for: [place] Blas, VALP, VALPR, DS6, CRBAM, CRBAMP, CARB2, XCRBAM 
 No results found for: LITH  
RADIOLOGY REPORTS:(reviewed/updated 12/3/2018) No results found. MEDICATIONS ALL MEDICATIONS:  
Current Facility-Administered Medications Medication Dose Route Frequency  hydrOXYzine HCl (ATARAX) tablet 25 mg  25 mg Oral Q6H PRN  
 traZODone (DESYREL) tablet 50 mg  50 mg Oral QHS  hydrocortisone (CORTAID) 1 % cream   Topical BID  ziprasidone (GEODON) 20 mg in sterile water (preservative free) 1 mL injection  20 mg IntraMUSCular BID PRN  
 OLANZapine (ZyPREXA) tablet 5 mg  5 mg Oral Q6H PRN  
 benztropine (COGENTIN) tablet 2 mg  2 mg Oral BID PRN  
 benztropine (COGENTIN) injection 2 mg  2 mg IntraMUSCular BID PRN  
 LORazepam (ATIVAN) injection 2 mg  2 mg IntraMUSCular Q4H PRN  
 acetaminophen (TYLENOL) tablet 650 mg  650 mg Oral Q4H PRN  
 ibuprofen (MOTRIN) tablet 400 mg  400 mg Oral Q8H PRN  
 magnesium hydroxide (MILK OF MAGNESIA) 400 mg/5 mL oral suspension 30 mL  30 mL Oral DAILY PRN  
 nicotine (NICODERM CQ) 21 mg/24 hr patch 1 Patch  1 Patch TransDERmal DAILY PRN  
 gabapentin (NEURONTIN) capsule 600 mg  600 mg Oral TID  busPIRone (BUSPAR) tablet 7.5 mg  7.5 mg Oral TID  buPROPion SR (WELLBUTRIN SR) tablet 150 mg  150 mg Oral DAILY  
  
SCHEDULED MEDICATIONS:  
Current Facility-Administered Medications Medication Dose Route Frequency  traZODone (DESYREL) tablet 50 mg  50 mg Oral QHS  hydrocortisone (CORTAID) 1 % cream   Topical BID  
 gabapentin (NEURONTIN) capsule 600 mg  600 mg Oral TID  busPIRone (BUSPAR) tablet 7.5 mg  7.5 mg Oral TID  buPROPion SR (WELLBUTRIN SR) tablet 150 mg  150 mg Oral DAILY ASSESSMENT & PLAN  
 
DIAGNOSES REQUIRING ACTIVE TREATMENT AND MONITORING: (reviewed/updated 12/3/2018) Patient Active Hospital Problem List: 
 Major Depression (11/30/2018) Assessment: moderate to severe   Plan: Agree with inpatient hospitalization for further stabilization, safety monitoring and medication management Medications- resumed home meds- wellbutring SR 150mg daily, buspirone 7.5mg TID Encourage groups Polysubstance (excluding opioids) dependence w/o physiol dependence ([facility] [address] (12/2/2018) Assessment: mod Plan: continue opiate detox Continue benzo/ alcohol detox Encourage patient to attend AA meetings In summary, Emani Cooley, is a 29 y.o.  female who presents with a severe exacerbation of the principal diagnosis of Depression Patient's condition isimproving. Patient requires continued inpatient hospitalization for further stabilization, safety monitoring and medication management. I will continue to coordinate the provision of individual, milieu, occupational, group, and substance abuse therapies to address target symptoms/diagnoses as deemed appropriate for the individual patient. A coordinated, multidisplinary treatment team round was conducted with the patient (this team consists of the nurse, psychiatric unit pharmcist,  and writer). Complete current electronic health record for patient has been reviewed today including consultant notes, ancillary staff notes, nurses and psychiatric tech notes. Suicide risk assessment completed and patient deemed to be of low risk for suicide at this time. The following regarding medications was addressed during rounds with patient:  
the risks and benefits of the proposed medication. The patient was given the opportunity to ask questions. Informed consent given to the use of the above medications. Will continue to adjust psychiatric and non-psychiatric medications (see above \"medication\" section and orders section for details) as deemed appropriate & based upon diagnoses and response to treatment. I will continue to order blood tests/labs and diagnostic tests as deemed appropriate and review results as they become available (see orders for details and above listed lab/test results). I will order psychiatric records from previous [facility] hospitals to further elucidate the nature of patient's psychopathology and review once available. I will gather additional collateral information from friends, family and o/p treatment team to further elucidate the nature of patient's psychopathology and baselline level of psychiatric functioning. I certify that this patient's inpatient psychiatric hospital services furnished since the previous certification were, and continue to be, required for treatment that could reasonably be expected to improve the patient's condition, or for diagnostic study, and that the patient continues to need, on a daily basis, active treatment furnished directly by or requiring the supervision of inpatient psychiatric facility personnel. In addition, the hospital records show that services furnished were intensive treatment services, admission or related services, or equivalent services. EXPECTED DISCHARGE DATE/DAY: TBD  
 
DISPOSITION: Home Signed By:  
Chaparro Tena MD 
12/3/2018
